# Patient Record
Sex: MALE | Race: WHITE | Employment: FULL TIME | URBAN - METROPOLITAN AREA
[De-identification: names, ages, dates, MRNs, and addresses within clinical notes are randomized per-mention and may not be internally consistent; named-entity substitution may affect disease eponyms.]

---

## 2017-01-26 ENCOUNTER — OFFICE VISIT (OUTPATIENT)
Dept: INTERNAL MEDICINE CLINIC | Facility: CLINIC | Age: 67
End: 2017-01-26

## 2017-01-26 VITALS
BODY MASS INDEX: 35.16 KG/M2 | SYSTOLIC BLOOD PRESSURE: 108 MMHG | DIASTOLIC BLOOD PRESSURE: 58 MMHG | WEIGHT: 224 LBS | OXYGEN SATURATION: 98 % | HEIGHT: 67 IN | HEART RATE: 95 BPM | TEMPERATURE: 98 F

## 2017-01-26 DIAGNOSIS — E66.09 NON MORBID OBESITY DUE TO EXCESS CALORIES: ICD-10-CM

## 2017-01-26 DIAGNOSIS — I10 ESSENTIAL HYPERTENSION: ICD-10-CM

## 2017-01-26 DIAGNOSIS — Z00.00 ANNUAL PHYSICAL EXAM: Primary | ICD-10-CM

## 2017-01-26 DIAGNOSIS — Z12.11 ENCOUNTER FOR SCREENING COLONOSCOPY: ICD-10-CM

## 2017-01-26 DIAGNOSIS — R53.83 FATIGUE, UNSPECIFIED TYPE: ICD-10-CM

## 2017-01-26 PROCEDURE — 99387 INIT PM E/M NEW PAT 65+ YRS: CPT | Performed by: INTERNAL MEDICINE

## 2017-01-26 PROCEDURE — 93010 ELECTROCARDIOGRAM REPORT: CPT | Performed by: INTERNAL MEDICINE

## 2017-01-26 PROCEDURE — 90732 PPSV23 VACC 2 YRS+ SUBQ/IM: CPT | Performed by: INTERNAL MEDICINE

## 2017-01-26 PROCEDURE — 93005 ELECTROCARDIOGRAM TRACING: CPT | Performed by: INTERNAL MEDICINE

## 2017-01-26 PROCEDURE — 90471 IMMUNIZATION ADMIN: CPT | Performed by: INTERNAL MEDICINE

## 2017-01-26 RX ORDER — QUINAPRIL 20 MG/1
20 TABLET ORAL DAILY
COMMUNITY
End: 2017-04-28

## 2017-01-26 RX ORDER — ALBUTEROL SULFATE 90 UG/1
2 AEROSOL, METERED RESPIRATORY (INHALATION) AS NEEDED
COMMUNITY
End: 2018-12-13

## 2017-01-26 NOTE — PROGRESS NOTES
Rosette Flores is a 73year old malePatient presents with:  Establish Care: Last physical exam 1/7/17 Dr. Susan Juarez MD ([address], [facility], [address] [place] [address] X:[phone]), last dental exam 11/10/16. Has never had colonoscopy.  Patient double vision  HEENT: denies nasal congestion, sinus pain, ST, or sore throat  LUNGS: denies shortness of breath, cough or wheezing  CARDIOVASCULAR: denies chest pain or pressure or palpitations  GI: denies abdominal pain, N/V, diarrhea, constipation, hema

## 2017-01-29 PROBLEM — I10 ESSENTIAL HYPERTENSION: Status: ACTIVE | Noted: 2017-01-29

## 2017-01-29 PROBLEM — E66.09 NON MORBID OBESITY DUE TO EXCESS CALORIES: Status: ACTIVE | Noted: 2017-01-29

## 2017-02-07 ENCOUNTER — LAB ENCOUNTER (OUTPATIENT)
Dept: LAB | Age: 67
End: 2017-02-07
Attending: INTERNAL MEDICINE
Payer: COMMERCIAL

## 2017-02-07 DIAGNOSIS — R53.83 FATIGUE, UNSPECIFIED TYPE: ICD-10-CM

## 2017-02-07 DIAGNOSIS — Z00.00 ANNUAL PHYSICAL EXAM: ICD-10-CM

## 2017-02-07 DIAGNOSIS — I10 ESSENTIAL HYPERTENSION: ICD-10-CM

## 2017-02-07 LAB
ALBUMIN SERPL BCP-MCNC: 4.1 G/DL (ref 3.5–4.8)
ALBUMIN/GLOB SERPL: 1 {RATIO} (ref 1–2)
ALP SERPL-CCNC: 94 U/L (ref 32–100)
ALT SERPL-CCNC: 25 U/L (ref 17–63)
ANION GAP SERPL CALC-SCNC: 8 MMOL/L (ref 0–18)
AST SERPL-CCNC: 21 U/L (ref 15–41)
BASOPHILS # BLD: 0.1 K/UL (ref 0–0.2)
BASOPHILS NFR BLD: 1 %
BILIRUB SERPL-MCNC: 0.7 MG/DL (ref 0.3–1.2)
BUN SERPL-MCNC: 14 MG/DL (ref 8–20)
BUN/CREAT SERPL: 12.3 (ref 10–20)
CALCIUM SERPL-MCNC: 9.4 MG/DL (ref 8.5–10.5)
CHLORIDE SERPL-SCNC: 102 MMOL/L (ref 95–110)
CHOLEST SERPL-MCNC: 151 MG/DL (ref 110–200)
CO2 SERPL-SCNC: 27 MMOL/L (ref 22–32)
CREAT SERPL-MCNC: 1.14 MG/DL (ref 0.5–1.5)
EOSINOPHIL # BLD: 0.4 K/UL (ref 0–0.7)
EOSINOPHIL NFR BLD: 5 %
ERYTHROCYTE [DISTWIDTH] IN BLOOD BY AUTOMATED COUNT: 15.1 % (ref 11–15)
GLOBULIN PLAS-MCNC: 4.1 G/DL (ref 2.5–3.7)
GLUCOSE SERPL-MCNC: 140 MG/DL (ref 70–99)
HCT VFR BLD AUTO: 46.7 % (ref 41–52)
HDLC SERPL-MCNC: 36 MG/DL
HGB BLD-MCNC: 15.9 G/DL (ref 13.5–17.5)
LDLC SERPL CALC-MCNC: 98 MG/DL (ref 0–99)
LYMPHOCYTES # BLD: 3 K/UL (ref 1–4)
LYMPHOCYTES NFR BLD: 40 %
MCH RBC QN AUTO: 31.6 PG (ref 27–32)
MCHC RBC AUTO-ENTMCNC: 34 G/DL (ref 32–37)
MCV RBC AUTO: 93.1 FL (ref 80–100)
MONOCYTES # BLD: 0.8 K/UL (ref 0–1)
MONOCYTES NFR BLD: 11 %
NEUTROPHILS # BLD AUTO: 3.4 K/UL (ref 1.8–7.7)
NEUTROPHILS NFR BLD: 44 %
NONHDLC SERPL-MCNC: 115 MG/DL
OSMOLALITY UR CALC.SUM OF ELEC: 287 MOSM/KG (ref 275–295)
PLATELET # BLD AUTO: 252 K/UL (ref 140–400)
PMV BLD AUTO: 9.1 FL (ref 7.4–10.3)
POTASSIUM SERPL-SCNC: 4.3 MMOL/L (ref 3.3–5.1)
PROT SERPL-MCNC: 8.2 G/DL (ref 5.9–8.4)
PSA SERPL-MCNC: 1.1 NG/ML (ref 0–4)
RBC # BLD AUTO: 5.02 M/UL (ref 4.5–5.9)
SODIUM SERPL-SCNC: 137 MMOL/L (ref 136–144)
TRIGL SERPL-MCNC: 86 MG/DL (ref 1–149)
WBC # BLD AUTO: 7.6 K/UL (ref 4–11)

## 2017-02-07 PROCEDURE — 80061 LIPID PANEL: CPT

## 2017-02-07 PROCEDURE — 36415 COLL VENOUS BLD VENIPUNCTURE: CPT

## 2017-02-07 PROCEDURE — 80053 COMPREHEN METABOLIC PANEL: CPT

## 2017-02-07 PROCEDURE — 85025 COMPLETE CBC W/AUTO DIFF WBC: CPT

## 2017-02-08 ENCOUNTER — TELEPHONE (OUTPATIENT)
Dept: INTERNAL MEDICINE CLINIC | Facility: CLINIC | Age: 67
End: 2017-02-08

## 2017-02-08 DIAGNOSIS — R73.01 ABNORMAL FASTING GLUCOSE: Primary | ICD-10-CM

## 2017-02-16 ENCOUNTER — APPOINTMENT (OUTPATIENT)
Dept: LAB | Age: 67
End: 2017-02-16
Attending: INTERNAL MEDICINE
Payer: COMMERCIAL

## 2017-02-16 ENCOUNTER — TELEPHONE (OUTPATIENT)
Dept: INTERNAL MEDICINE CLINIC | Facility: CLINIC | Age: 67
End: 2017-02-16

## 2017-02-16 LAB — HBA1C MFR BLD: 6.9 % (ref 4–6)

## 2017-02-16 PROCEDURE — 83036 HEMOGLOBIN GLYCOSYLATED A1C: CPT | Performed by: INTERNAL MEDICINE

## 2017-02-16 NOTE — TELEPHONE ENCOUNTER
LMTCB   - to  please call patient and schedule aguila for 1 week with  to discuss lab results thank you

## 2017-02-21 ENCOUNTER — TELEPHONE (OUTPATIENT)
Dept: INTERNAL MEDICINE CLINIC | Facility: CLINIC | Age: 67
End: 2017-02-21

## 2017-02-21 NOTE — TELEPHONE ENCOUNTER
Please adjust his appointment for this Thursday: it was a my chart scheduling error: he should come in at 5pm. After this adjustment, please make sure there are no available slots after 4:20. thanks

## 2017-02-23 ENCOUNTER — OFFICE VISIT (OUTPATIENT)
Dept: INTERNAL MEDICINE CLINIC | Facility: CLINIC | Age: 67
End: 2017-02-23

## 2017-02-23 VITALS
TEMPERATURE: 99 F | DIASTOLIC BLOOD PRESSURE: 72 MMHG | OXYGEN SATURATION: 98 % | WEIGHT: 219 LBS | SYSTOLIC BLOOD PRESSURE: 124 MMHG | HEIGHT: 67 IN | HEART RATE: 88 BPM | BODY MASS INDEX: 34.37 KG/M2

## 2017-02-23 DIAGNOSIS — E11.9 NEW ONSET TYPE 2 DIABETES MELLITUS (HCC): Primary | ICD-10-CM

## 2017-02-23 DIAGNOSIS — E78.5 DYSLIPIDEMIA: ICD-10-CM

## 2017-02-23 PROCEDURE — 99214 OFFICE O/P EST MOD 30 MIN: CPT | Performed by: INTERNAL MEDICINE

## 2017-02-23 PROCEDURE — 99212 OFFICE O/P EST SF 10 MIN: CPT | Performed by: INTERNAL MEDICINE

## 2017-02-23 PROCEDURE — 90715 TDAP VACCINE 7 YRS/> IM: CPT | Performed by: INTERNAL MEDICINE

## 2017-02-23 PROCEDURE — 90471 IMMUNIZATION ADMIN: CPT | Performed by: INTERNAL MEDICINE

## 2017-02-23 RX ORDER — SIMVASTATIN 20 MG
20 TABLET ORAL NIGHTLY
Qty: 30 TABLET | Refills: 0 | Status: SHIPPED | OUTPATIENT
Start: 2017-02-23 | End: 2017-03-23

## 2017-02-24 PROBLEM — E78.5 DYSLIPIDEMIA: Status: ACTIVE | Noted: 2017-02-24

## 2017-02-24 PROBLEM — E11.9 DIABETES (HCC): Status: ACTIVE | Noted: 2017-02-24

## 2017-02-24 NOTE — PROGRESS NOTES
Barbie Lam is a 73year old male. Patient presents with:   Follow - Up      HPI:   Barbie Lam is a 73year old male who presents for: review lab results    He is feeling well; he has already lost 5 pounds from starting to walk and cutting down on sod exercise regularly. Given referral to diabetes education.   - [id number] [facility],  ED (INTERNAL)    2. Dyslipidemia   start aspirin 81mg daily, zocor 20mg daily.  Discussed cutting back on nuts, red meat, fried/fatty foods and again exercise to imp

## 2017-03-23 RX ORDER — SIMVASTATIN 20 MG
20 TABLET ORAL NIGHTLY
Qty: 30 TABLET | Refills: 0 | Status: CANCELLED | OUTPATIENT
Start: 2017-03-23

## 2017-03-23 NOTE — TELEPHONE ENCOUNTER
From: Warren Salgado  To: Carla Love DO  Sent: 3/23/2017 3:35 PM CDT  Subject: Medication Renewal Request    Original authorizing provider: DO Warren Kaye would like a refill of the following medications:  MetFORMIN HCl 500 MG Oral Tab

## 2017-03-23 NOTE — TELEPHONE ENCOUNTER
From: Mihaela Stevenson  To: Roman Castrejon DO  Sent: 3/23/2017 1:08 PM CDT  Subject: Medication Renewal Request    Original authorizing provider: DO Mihaela Coleman would like a refill of the following medications:  MetFORMIN HCl 500 MG Oral Tab

## 2017-03-24 RX ORDER — SIMVASTATIN 20 MG
20 TABLET ORAL NIGHTLY
Qty: 90 TABLET | Refills: 1 | Status: SHIPPED | OUTPATIENT
Start: 2017-03-24 | End: 2017-09-18

## 2017-04-08 ENCOUNTER — HOSPITAL ENCOUNTER (OUTPATIENT)
Dept: ENDOCRINOLOGY | Facility: HOSPITAL | Age: 67
Discharge: HOME | End: 2017-04-08
Attending: INTERNAL MEDICINE
Payer: COMMERCIAL

## 2017-04-08 VITALS — BODY MASS INDEX: 33.09 KG/M2 | HEIGHT: 67 IN | WEIGHT: 210.81 LBS

## 2017-04-08 DIAGNOSIS — E11.9 TYPE 2 DIABETES MELLITUS WITHOUT COMPLICATION, WITHOUT LONG-TERM CURRENT USE OF INSULIN (HCC): ICD-10-CM

## 2017-04-08 DIAGNOSIS — E11.9 DIABETES (HCC): Primary | ICD-10-CM

## 2017-04-08 NOTE — PROGRESS NOTES
Nancy Johns  : 1950 attended initial assessment for Diabetes Education:    Date: 2017   Start time: 0900   End time: 1000    Ht 67\"  Wt 210 lb 12.8 oz  BMI 33.01 kg/m2      GLYCOHEMOGLOBIN (HGA1C) (%)   Date Value   2017 6.9*   -------

## 2017-04-22 ENCOUNTER — HOSPITAL ENCOUNTER (OUTPATIENT)
Dept: ENDOCRINOLOGY | Facility: HOSPITAL | Age: 67
Discharge: HOME | End: 2017-04-22
Attending: INTERNAL MEDICINE
Payer: COMMERCIAL

## 2017-04-22 VITALS — BODY MASS INDEX: 32 KG/M2 | WEIGHT: 207.31 LBS

## 2017-04-22 DIAGNOSIS — E11.9 TYPE 2 DIABETES MELLITUS WITHOUT COMPLICATION, WITHOUT LONG-TERM CURRENT USE OF INSULIN (HCC): Primary | ICD-10-CM

## 2017-04-22 NOTE — PROGRESS NOTES
Lili Krish  [id number] attended Diabetes Education Group Class 1:    Date: 4/22/2017  Start time: 1000  End time: 1200    Pt is testing daily before dinner, blood sugars appear controlled.   He is making changes to his diet, eats 3 low-carbohydrate ky

## 2017-04-29 ENCOUNTER — HOSPITAL ENCOUNTER (OUTPATIENT)
Dept: ENDOCRINOLOGY | Facility: HOSPITAL | Age: 67
Discharge: HOME | End: 2017-04-29
Attending: INTERNAL MEDICINE
Payer: COMMERCIAL

## 2017-04-29 VITALS — BODY MASS INDEX: 32 KG/M2 | WEIGHT: 206.19 LBS

## 2017-04-29 DIAGNOSIS — E11.9 TYPE 2 DIABETES MELLITUS WITHOUT COMPLICATION, WITHOUT LONG-TERM CURRENT USE OF INSULIN (HCC): Primary | ICD-10-CM

## 2017-04-29 NOTE — PROGRESS NOTES
Libertad Monroy  [id number] attended Diabetes Education Group Class 2:    Date: 4/29/2017  Start time: 1000  End time: 1200    Wt:  18. 2#, 1# down from last week    Predinner:   (1 at 134)   Recommended that pt alternate predinner with FBG readings.

## 2017-05-06 ENCOUNTER — HOSPITAL ENCOUNTER (OUTPATIENT)
Dept: ENDOCRINOLOGY | Facility: HOSPITAL | Age: 67
Discharge: HOME | End: 2017-05-06
Attending: INTERNAL MEDICINE
Payer: COMMERCIAL

## 2017-05-06 VITALS — WEIGHT: 203 LBS | BODY MASS INDEX: 32 KG/M2

## 2017-05-06 DIAGNOSIS — E11.65 TYPE 2 DIABETES MELLITUS WITH HYPERGLYCEMIA, WITHOUT LONG-TERM CURRENT USE OF INSULIN (HCC): Primary | ICD-10-CM

## 2017-05-06 NOTE — PROGRESS NOTES
Henry Bocanegra  [id number] attended Diabetes Education Group Class 3:     Date: 5/6/2017  Start time: 1000 End time: 18    Prevention, detection and treatment of chronic complications  Reviewed how to reduce risks of complications including eye, foot, d

## 2017-05-13 ENCOUNTER — HOSPITAL ENCOUNTER (OUTPATIENT)
Dept: ENDOCRINOLOGY | Facility: HOSPITAL | Age: 67
Discharge: HOME | End: 2017-05-13
Attending: INTERNAL MEDICINE
Payer: COMMERCIAL

## 2017-05-13 VITALS — BODY MASS INDEX: 32 KG/M2 | WEIGHT: 201.31 LBS

## 2017-05-13 DIAGNOSIS — E11.65 TYPE 2 DIABETES MELLITUS WITH HYPERGLYCEMIA, WITHOUT LONG-TERM CURRENT USE OF INSULIN (HCC): Primary | ICD-10-CM

## 2017-05-13 NOTE — PROGRESS NOTES
Carmina Salcido  [id number] attended Diabetes Education Group Class 4:     Date: 2017  Start time: 1000  End time: 11:45    Wt:  201.3#     Pt has lost 9.5# since starting diabetes classes 1 month ago.     FB-110 (1 at 140)        Predinner:  87-

## 2017-05-30 DIAGNOSIS — E11.9 DIABETES (HCC): ICD-10-CM

## 2017-05-31 NOTE — TELEPHONE ENCOUNTER
From: Radha Wright  To: Francisco Guerra DO  Sent: 5/30/2017 3:43 PM CDT  Subject: Medication Renewal Request    Original authorizing provider: DO Radha Morales would like a refill of the following medications:  Blood Glucose Monitoring Suppl

## 2017-06-01 ENCOUNTER — TELEPHONE (OUTPATIENT)
Dept: INTERNAL MEDICINE CLINIC | Facility: CLINIC | Age: 67
End: 2017-06-01

## 2017-06-01 ENCOUNTER — LAB ENCOUNTER (OUTPATIENT)
Dept: LAB | Age: 67
End: 2017-06-01
Attending: INTERNAL MEDICINE
Payer: COMMERCIAL

## 2017-06-01 DIAGNOSIS — E78.5 DYSLIPIDEMIA: ICD-10-CM

## 2017-06-01 DIAGNOSIS — E11.9 TYPE 2 DIABETES MELLITUS WITHOUT COMPLICATION, WITHOUT LONG-TERM CURRENT USE OF INSULIN (HCC): ICD-10-CM

## 2017-06-01 DIAGNOSIS — R53.83 FATIGUE, UNSPECIFIED TYPE: ICD-10-CM

## 2017-06-01 DIAGNOSIS — E11.9 TYPE 2 DIABETES MELLITUS WITHOUT COMPLICATION, WITHOUT LONG-TERM CURRENT USE OF INSULIN (HCC): Primary | ICD-10-CM

## 2017-06-01 PROCEDURE — 80053 COMPREHEN METABOLIC PANEL: CPT

## 2017-06-01 PROCEDURE — 82570 ASSAY OF URINE CREATININE: CPT

## 2017-06-01 PROCEDURE — 80061 LIPID PANEL: CPT

## 2017-06-01 PROCEDURE — 36415 COLL VENOUS BLD VENIPUNCTURE: CPT

## 2017-06-01 PROCEDURE — 83036 HEMOGLOBIN GLYCOSYLATED A1C: CPT

## 2017-06-01 PROCEDURE — 85025 COMPLETE CBC W/AUTO DIFF WBC: CPT

## 2017-06-01 PROCEDURE — 82043 UR ALBUMIN QUANTITATIVE: CPT

## 2017-06-01 NOTE — TELEPHONE ENCOUNTER
Patient at lab downstairs to get labs drawn, states he is fasting and was told in February that he needs to have repeat labs done in 3 months. No lab order in the system.  Per 2/23/17 office visit note, \"repeat labs in 3 months\" and do urine microalbumin

## 2017-06-04 ENCOUNTER — TELEPHONE (OUTPATIENT)
Dept: INTERNAL MEDICINE CLINIC | Facility: CLINIC | Age: 67
End: 2017-06-04

## 2017-06-04 DIAGNOSIS — E11.9 TYPE 2 DIABETES MELLITUS WITHOUT COMPLICATION, WITHOUT LONG-TERM CURRENT USE OF INSULIN (HCC): Primary | ICD-10-CM

## 2017-06-05 NOTE — TELEPHONE ENCOUNTER
Patient called back -relayed DR. LOAIZA message and he verbalized understanding . Number for DR. Ch given

## 2017-06-05 NOTE — TELEPHONE ENCOUNTER
Please notify patient that labs are good-DM is controlled, and cholesterol is controlled. Would like him to continue on these medications. For the diabetes, it is important that he get a proper eye exam to monitor for DM affecting the eye.  Would like him t

## 2017-07-17 ENCOUNTER — OFFICE VISIT (OUTPATIENT)
Dept: OPTOMETRY | Facility: CLINIC | Age: 67
End: 2017-07-17

## 2017-07-17 DIAGNOSIS — H52.13 MYOPIA, BILATERAL: ICD-10-CM

## 2017-07-17 DIAGNOSIS — E11.9 TYPE 2 DIABETES MELLITUS WITHOUT COMPLICATION, WITHOUT LONG-TERM CURRENT USE OF INSULIN (HCC): Primary | ICD-10-CM

## 2017-07-17 DIAGNOSIS — H40.003 GLAUCOMA SUSPECT, BILATERAL: ICD-10-CM

## 2017-07-17 DIAGNOSIS — H25.13 AGE-RELATED NUCLEAR CATARACT OF BOTH EYES: ICD-10-CM

## 2017-07-17 PROBLEM — H52.10 MYOPIA: Status: ACTIVE | Noted: 2017-07-17

## 2017-07-17 PROBLEM — H40.009 GLAUCOMA SUSPECT: Status: ACTIVE | Noted: 2017-07-17

## 2017-07-17 PROCEDURE — 92004 COMPRE OPH EXAM NEW PT 1/>: CPT | Performed by: OPTOMETRIST

## 2017-07-17 PROCEDURE — 92015 DETERMINE REFRACTIVE STATE: CPT | Performed by: OPTOMETRIST

## 2017-07-17 NOTE — ASSESSMENT & PLAN NOTE
I advised patient that due to CD asymmetry that he should have a baseline VF OCT & pachy. Appointment made.

## 2017-07-17 NOTE — PROGRESS NOTES
Nico Lopez is a 78year old male. HPI:     HPI     Diabetic Eye Exam   Diabetes characteristics include Type 2, controlled with diet and taking oral medications. Duration of 5 months.            Comments   Patient was referred by his PCP for a diabe Sulfate  (90 Base) MCG/ACT Inhalation Aero Soln Inhale 2 puffs into the lungs as needed for Wheezing (2-3 times daily PRN).  Disp:  Rfl:        Allergies:    Penicillins             Hives    ROS:     ROS     Negative for: Constitutional, Gastrointest Refraction (Auto)       Sphere Cylinder Axis Dist VA Add    Right -7.75 -2.25 090      Left -6.25 -0.75 090            Manifest Refraction #2       Sphere Cylinder Axis Dist VA Add    Right -8.00 -1.50 100 20/30-2 +2.50    Left -7.00 -1.25 100 20/25-3 +2. 5

## 2017-07-17 NOTE — PATIENT INSTRUCTIONS
Age-related nuclear cataract of both eyes  No treatment is required. Will continue to observe. Glaucoma suspect  I advised patient that due to CD asymmetry that he should have a baseline VF OCT & pachy. Appointment made.     Diabetes ([address]  I advised p

## 2017-08-19 ENCOUNTER — OFFICE VISIT (OUTPATIENT)
Dept: OPHTHALMOLOGY | Facility: CLINIC | Age: 67
End: 2017-08-19

## 2017-08-19 DIAGNOSIS — H40.003 GLAUCOMA SUSPECT, BILATERAL: ICD-10-CM

## 2017-08-19 PROCEDURE — 92133 CPTRZD OPH DX IMG PST SGM ON: CPT | Performed by: OPHTHALMOLOGY

## 2017-08-19 PROCEDURE — 92083 EXTENDED VISUAL FIELD XM: CPT | Performed by: OPHTHALMOLOGY

## 2017-08-19 PROCEDURE — 76514 ECHO EXAM OF EYE THICKNESS: CPT | Performed by: OPHTHALMOLOGY

## 2017-08-23 NOTE — PROGRESS NOTES
Jerry Spicer is a 78year old male. HPI:     HPI     Patient is here for a glaucoma work up with HVF, OCT and Pachy, rodolfo TODD     Last edited by Akilah Gilliam on 8/19/2017  9:57 AM. (History)        Patient History:  Past Medical History:   Diagnosis D ASSESSMENT/PLAN:     Diagnoses and Plan:     Glaucoma suspect  Normal VF, OU. Normal OCT, OD. Abnormal OCT, OS. Start Latanoprost OS, Q HS. No orders of the defined types were placed in this encounter.       Meds This Visit:    No prescriptions reque

## 2017-08-24 RX ORDER — LATANOPROST 50 UG/ML
1 SOLUTION/ DROPS OPHTHALMIC NIGHTLY
Qty: 1 BOTTLE | Refills: 11 | Status: SHIPPED | OUTPATIENT
Start: 2017-08-24 | End: 2017-09-23

## 2017-08-24 NOTE — TELEPHONE ENCOUNTER
Spoke to pt and Rx sent to [facility] to sign off on. Pt is scheduled for IOP and Gonio on Dec 9 at 10:45.

## 2017-08-24 NOTE — TELEPHONE ENCOUNTER
LM for pt to call us back. Pt will be starting Latanoprost OU qhs and return in December 2017 for IOP and Gonio with RJM. Waiting for call back from Pt. Rx on Latanoprost pended to BILL.

## 2017-09-18 RX ORDER — SIMVASTATIN 20 MG
20 TABLET ORAL NIGHTLY
Qty: 90 TABLET | Refills: 0 | Status: SHIPPED | OUTPATIENT
Start: 2017-09-18 | End: 2017-11-13

## 2017-09-18 NOTE — TELEPHONE ENCOUNTER
From: Marcela Carmona  Sent: 9/18/2017 10:28 AM CDT  Subject: Medication Renewal Request    Irina Jazlyn Heart Nurse would like a refill of the following medications:     MetFORMIN HCl 500 MG Oral Tab Keyur Randle DO]     simvastatin (ZOCOR) 20 MG Oral Tab [Elyse Na

## 2017-11-13 ENCOUNTER — OFFICE VISIT (OUTPATIENT)
Dept: INTERNAL MEDICINE CLINIC | Facility: CLINIC | Age: 67
End: 2017-11-13

## 2017-11-13 VITALS
HEART RATE: 78 BPM | HEIGHT: 67 IN | SYSTOLIC BLOOD PRESSURE: 110 MMHG | DIASTOLIC BLOOD PRESSURE: 82 MMHG | BODY MASS INDEX: 29.03 KG/M2 | WEIGHT: 185 LBS

## 2017-11-13 DIAGNOSIS — Z11.59 NEED FOR HEPATITIS C SCREENING TEST: ICD-10-CM

## 2017-11-13 DIAGNOSIS — I10 ESSENTIAL HYPERTENSION: ICD-10-CM

## 2017-11-13 DIAGNOSIS — Z13.1 SCREENING FOR DIABETES MELLITUS (DM): ICD-10-CM

## 2017-11-13 DIAGNOSIS — E11.9 TYPE 2 DIABETES MELLITUS WITHOUT COMPLICATION, WITHOUT LONG-TERM CURRENT USE OF INSULIN (HCC): ICD-10-CM

## 2017-11-13 DIAGNOSIS — Z12.5 SCREENING FOR PROSTATE CANCER: ICD-10-CM

## 2017-11-13 DIAGNOSIS — Z00.00 ENCOUNTER FOR ANNUAL HEALTH EXAMINATION: ICD-10-CM

## 2017-11-13 DIAGNOSIS — Z00.00 ANNUAL PHYSICAL EXAM: Primary | ICD-10-CM

## 2017-11-13 DIAGNOSIS — Z13.6 SCREENING FOR CARDIOVASCULAR CONDITION: ICD-10-CM

## 2017-11-13 DIAGNOSIS — E78.5 DYSLIPIDEMIA: ICD-10-CM

## 2017-11-13 DIAGNOSIS — R53.83 FATIGUE, UNSPECIFIED TYPE: ICD-10-CM

## 2017-11-13 DIAGNOSIS — L40.9 PSORIASIS: ICD-10-CM

## 2017-11-13 PROCEDURE — 99213 OFFICE O/P EST LOW 20 MIN: CPT | Performed by: INTERNAL MEDICINE

## 2017-11-13 PROCEDURE — 90653 IIV ADJUVANT VACCINE IM: CPT | Performed by: INTERNAL MEDICINE

## 2017-11-13 PROCEDURE — G0008 ADMIN INFLUENZA VIRUS VAC: HCPCS | Performed by: INTERNAL MEDICINE

## 2017-11-13 PROCEDURE — G0439 PPPS, SUBSEQ VISIT: HCPCS | Performed by: INTERNAL MEDICINE

## 2017-11-13 RX ORDER — LATANOPROST 50 UG/ML
1 SOLUTION/ DROPS OPHTHALMIC NIGHTLY
Refills: 1 | COMMUNITY
Start: 2017-11-01 | End: 2018-05-12

## 2017-11-13 RX ORDER — ASPIRIN 81 MG/1
81 TABLET ORAL DAILY
Qty: 90 TABLET | Refills: 3 | Status: SHIPPED | OUTPATIENT
Start: 2017-11-13 | End: 2019-06-24

## 2017-11-13 RX ORDER — SIMVASTATIN 20 MG
20 TABLET ORAL NIGHTLY
Qty: 90 TABLET | Refills: 0 | Status: SHIPPED | OUTPATIENT
Start: 2017-11-13 | End: 2018-03-19

## 2017-11-13 RX ORDER — FLUOCINONIDE 0.5 MG/G
1 OINTMENT TOPICAL 2 TIMES DAILY
Qty: 60 G | Refills: 3 | Status: SHIPPED | OUTPATIENT
Start: 2017-11-13 | End: 2021-06-08

## 2017-11-13 NOTE — PROGRESS NOTES
Diabetic Annual Assessment Clinical Note    /82 (BP Location: Left arm, Patient Position: Sitting, Cuff Size: adult)   Pulse 78   Ht 5' 7\" (1.702 m)   Wt 185 lb (83.9 kg)   BMI 28.98 kg/m²     Foot Assessment    Visual Inspection of the feet complet

## 2017-11-13 NOTE — PROGRESS NOTES
HPI:   Eri Fu is a 78year old male who presents for a Medicare Subsequent Annual Wellness visit (Pt already had Initial Annual Wellness).     He has a past medical history of HTN, type 2 DM, dyslipidemia, psoriasis, osteoarthritis, possible NEXT TEST) In Vitro Strip 1 each by Other route 2 (two) times daily. Use as directed. BRANT MICROLET LANCETS Does not apply Misc 1 lancet by Finger stick route 2 (two) times daily. Use as directed.       MEDICAL INFORMATION:   He  has a past medical histo ask people to repeat themselves:  No   I especially have trouble understanding the speech of women and children:  No I have trouble understanding the speaker in a large room such as at a meeting or place of [facility]:  No   Many people I talk to seem to mumb Pneumococcal, Zoster, Tetanus     Immunization History   Administered Date(s) Administered   • Pneumovax 23 01/26/2017   • TDAP 02/23/2017       ASSESSMENT AND OTHER RELEVANT CHRONIC CONDITIONS:   Marcela Carmona is a 78year old male who presents for a Med B, Tetanus, or Pneumococcal?: No     Functional Ability     Bathing or Showering: Able without help    Toileting: Able without help    Dressing: Able without help    Eating: Able without help    Driving: Able without help    Preparing your meals: Able with Maintenance if applicable    Flex Sigmoidoscopy Screen every 10 years No results found for this or any previous visit. No flowsheet data found. Fecal Occult Blood Annually No results found for: FOB No flowsheet data found.     Glaucoma Screening      Op

## 2017-11-13 NOTE — PATIENT INSTRUCTIONS
Felicitas Reese's SCREENING SCHEDULE   Tests on this list are recommended by your physician but may not be covered, or covered at this frequency, by your insurer. Please check with your insurance carrier before scheduling to verify coverage.     PREVENTATI Colonoscopy Screen   Covered every 10 years- more often if abnormal Colonoscopy,10 Years due on 06/06/2001 Update Health Maintenance if applicable    Flex Sigmoidoscopy Screen  Covered every 5 years No results found for this or any previous visit.  No flows 02/23/17  -TETANUS, DIPHTHERIA TOXOIDS AND ACELLULAR PERTUSIS VACCINE (TDAP), >7 YEARS, IM USE    This may be covered with your prescription benefits, but Medicare does not cover unless Medically needed    Zoster (Not covered by Medicare Part B) No orders

## 2017-12-09 ENCOUNTER — OFFICE VISIT (OUTPATIENT)
Dept: OPHTHALMOLOGY | Facility: CLINIC | Age: 67
End: 2017-12-09

## 2017-12-09 DIAGNOSIS — H40.1121 PRIMARY OPEN-ANGLE GLAUCOMA, LEFT EYE, MILD STAGE: Primary | ICD-10-CM

## 2017-12-09 PROCEDURE — G0463 HOSPITAL OUTPT CLINIC VISIT: HCPCS | Performed by: OPHTHALMOLOGY

## 2017-12-09 PROCEDURE — 99213 OFFICE O/P EST LOW 20 MIN: CPT | Performed by: OPHTHALMOLOGY

## 2017-12-09 PROCEDURE — 92020 GONIOSCOPY: CPT | Performed by: OPHTHALMOLOGY

## 2017-12-09 NOTE — PROGRESS NOTES
Ezequiel Casper is a 78year old male. HPI:     HPI     Patient is here for an IOP check and gonioscopy. Patient started Latanoprost OS QHS on 8/23/17. Patient has no ocular complaints.       Last edited by Caio Wick on 12/9/2017 10:40 AM. (History daily. Use as directed. Disp: 100 strip Rfl: 1   BRANT MICROLET LANCETS Does not apply Misc 1 lancet by Finger stick route 2 (two) times daily. Use as directed.  Disp: 1 Box Rfl: 1   Albuterol Sulfate  (90 Base) MCG/ACT Inhalation Aero Soln Inhale 2 bedtime in the left eye. Discussed that he should continue taking Latanoprost every night in the left eye unless it is changed by the doctor.     Discussed potential side effects of Latanoprost drops such as color changes of the iris, mild redness of the

## 2017-12-09 NOTE — PATIENT INSTRUCTIONS
Primary open-angle glaucoma, left eye, mild stage  IOP has improved since starting Latanoprost at bedtime in the left eye. Continue Latanoprost at bedtime in the left eye.    Discussed that he should continue taking Latanoprost every night in the left eye

## 2017-12-13 DIAGNOSIS — E11.9 TYPE 2 DIABETES MELLITUS WITHOUT COMPLICATION, WITHOUT LONG-TERM CURRENT USE OF INSULIN (HCC): ICD-10-CM

## 2017-12-13 DIAGNOSIS — E78.5 DYSLIPIDEMIA: ICD-10-CM

## 2017-12-13 RX ORDER — SIMVASTATIN 20 MG
20 TABLET ORAL NIGHTLY
Qty: 90 TABLET | Refills: 0
Start: 2017-12-13

## 2017-12-13 NOTE — TELEPHONE ENCOUNTER
Metformin was last filled 11/13 for #180 (3 month supply) no RF, and Simvastatin was filled 11/13/17 for #90 (3 month supply) no refill. [employer] message sent to patient.      Current refill request refused due to refill is either a duplicate request or has

## 2017-12-13 NOTE — TELEPHONE ENCOUNTER
From: Libertad Monroy  Sent: 12/13/2017 10:19 AM CST  Subject: Medication Renewal Request    Barron Ureña.  Ugo Lee would like a refill of the following medications:     MetFORMIN HCl 500 MG Oral Tab Rosalind Ordonez DORadha     simvastatin (ZOCOR) 20 MG Oral Tab Eric LOAIZA

## 2017-12-29 ENCOUNTER — OFFICE VISIT (OUTPATIENT)
Dept: INTERNAL MEDICINE CLINIC | Facility: CLINIC | Age: 67
End: 2017-12-29

## 2017-12-29 VITALS
SYSTOLIC BLOOD PRESSURE: 136 MMHG | WEIGHT: 194 LBS | TEMPERATURE: 98 F | DIASTOLIC BLOOD PRESSURE: 66 MMHG | BODY MASS INDEX: 30 KG/M2 | HEART RATE: 89 BPM | OXYGEN SATURATION: 98 %

## 2017-12-29 DIAGNOSIS — J02.0 STREP PHARYNGITIS: Primary | ICD-10-CM

## 2017-12-29 PROCEDURE — 99213 OFFICE O/P EST LOW 20 MIN: CPT | Performed by: INTERNAL MEDICINE

## 2017-12-29 PROCEDURE — G0463 HOSPITAL OUTPT CLINIC VISIT: HCPCS | Performed by: INTERNAL MEDICINE

## 2017-12-29 RX ORDER — AZITHROMYCIN 250 MG/1
TABLET, FILM COATED ORAL
Qty: 6 TABLET | Refills: 0 | Status: SHIPPED | OUTPATIENT
Start: 2017-12-29 | End: 2018-04-24

## 2017-12-29 NOTE — PROGRESS NOTES
Madie Ocampo is a 78year old male. Patient presents with:  Sore Throat: Onset: 4 days ago - got worse yesterday but a little better today. No fever/chills. \"Cough due to throat\". Rare sneezing, some runny nose.       HPI:   Madie Ocampo is a 78 yea daily PRN).  Disp:  Rfl:       Past Medical History:   Diagnosis Date   • Diabetes ([address]    • Dyslipidemia    • Essential hypertension    • Primary open-angle glaucoma, left eye, mild stage 8/23/2017 8//23/17 Started on Latanoprost qhs OS by Dr. Kosta Avila

## 2018-01-11 DIAGNOSIS — E11.9 DIABETES (HCC): ICD-10-CM

## 2018-01-12 NOTE — TELEPHONE ENCOUNTER
From: Warren Salgado  Sent: 1/11/2018 8:44 PM CST  Subject: Medication Renewal Request    Gunjan Brush.  Kristel Paulino would like a refill of the following medications:     Glucose Blood (BRANT CONTOUR NEXT TEST) In Vitro Strip [facility] [address] [facility] [address]

## 2018-03-19 DIAGNOSIS — E78.5 DYSLIPIDEMIA: ICD-10-CM

## 2018-03-19 DIAGNOSIS — E11.9 TYPE 2 DIABETES MELLITUS WITHOUT COMPLICATION, WITHOUT LONG-TERM CURRENT USE OF INSULIN (HCC): ICD-10-CM

## 2018-03-20 RX ORDER — SIMVASTATIN 20 MG
20 TABLET ORAL NIGHTLY
Qty: 90 TABLET | Refills: 0 | Status: SHIPPED | OUTPATIENT
Start: 2018-03-20 | End: 2018-06-19

## 2018-03-20 NOTE — TELEPHONE ENCOUNTER
Simvastatin refilled. Will route to  to please review and clarify metformin directions as metformin 500 mg BID was eRxed at 11/13/17 office visit but office visit note indicates metformin 500 mg only once daily.

## 2018-03-20 NOTE — TELEPHONE ENCOUNTER
From: Barbie Lam  Sent: 3/19/2018 1:00 PM CDT  Subject: Medication Renewal Request    Porsche Negrete.  Berwyn Bence would like a refill of the following medications:     MetFORMIN HCl 500 MG Oral Tab NADEEN Mcbride     simvastatin (ZOCOR) 20 MG Oral Tab [Elyse Dewitti

## 2018-04-24 RX ORDER — QUINAPRIL 20 MG/1
20 TABLET ORAL DAILY
Qty: 90 TABLET | Refills: 3 | Status: SHIPPED
Start: 2018-04-24 | End: 2019-04-22

## 2018-04-25 NOTE — TELEPHONE ENCOUNTER
From: Marcela Carmona  Sent: 4/23/2018 1:09 PM CDT  Subject: Medication Renewal Request    Irina Jazlyn Heart Nurse would like a refill of the following medications:     Quinapril HCl (ACCUPRIL) 20 MG Oral Tab Keyur Randle DO]    Preferred pharmacy: [address] [place] [address]

## 2018-05-12 ENCOUNTER — OFFICE VISIT (OUTPATIENT)
Dept: OPHTHALMOLOGY | Facility: CLINIC | Age: 68
End: 2018-05-12

## 2018-05-12 DIAGNOSIS — E11.9 TYPE 2 DIABETES MELLITUS WITHOUT RETINOPATHY (HCC): ICD-10-CM

## 2018-05-12 DIAGNOSIS — H25.13 AGE-RELATED NUCLEAR CATARACT OF BOTH EYES: ICD-10-CM

## 2018-05-12 DIAGNOSIS — H40.1121 PRIMARY OPEN-ANGLE GLAUCOMA, LEFT EYE, MILD STAGE: Primary | ICD-10-CM

## 2018-05-12 PROCEDURE — 92014 COMPRE OPH EXAM EST PT 1/>: CPT | Performed by: OPHTHALMOLOGY

## 2018-05-12 PROCEDURE — 92250 FUNDUS PHOTOGRAPHY W/I&R: CPT | Performed by: OPHTHALMOLOGY

## 2018-05-12 RX ORDER — LATANOPROST 50 UG/ML
1 SOLUTION/ DROPS OPHTHALMIC NIGHTLY
Qty: 3 BOTTLE | Refills: 3 | Status: SHIPPED | OUTPATIENT
Start: 2018-05-12 | End: 2018-10-24

## 2018-05-12 NOTE — PROGRESS NOTES
Mikal Doll is a 78year old male.     HPI:     HPI     Diabetic Eye Exam    Additional comments: Pt has been a diabetic for 1 year  1 years on pills/ 0 years on Insulin   Pt checks his BS once a day   Pt's last blood sugar was 96 yesterday  Last HA1C wa Strip Use to test blood glucose twice daily as directed Disp: 100 strip Rfl: 3   Fluocinonide 0.05 % External Ointment Apply 1 g topically 2 (two) times daily. Disp: 60 g Rfl: 3   aspirin 81 MG Oral Tab EC Take 1 tablet (81 mg total) by mouth daily.  Disp: Cortical cataract inferonasal   1+ Nuclear sclerosis    Vitreous Vitreous floaters Vitreous floaters          Fundus Exam       Right Left    Disc Good rim  Good rim     C/D Ratio 0.6 0.8    Macula Normal- no BDR Normal- no BDR    Vessels Normal Normal

## 2018-05-12 NOTE — PATIENT INSTRUCTIONS
Type 2 diabetes mellitus without retinopathy ([facility] [address]  Diabetes type II: no background of retinopathy, no signs of neovascularization noted. Discussed ocular and systemic benefits of blood sugar control.   Diagnosis and treatment discussed in detail with christianne

## 2018-05-12 NOTE — ASSESSMENT & PLAN NOTE
Continue Latanoprost at bedtime in the left eye. Discussed that he should continue taking Latanoprost every night in the left eye unless it is changed by the doctor. Photos were taken today to document optic nerves.    Will have patient back in 4 month

## 2018-06-19 DIAGNOSIS — E11.9 TYPE 2 DIABETES MELLITUS WITHOUT COMPLICATION, WITHOUT LONG-TERM CURRENT USE OF INSULIN (HCC): ICD-10-CM

## 2018-06-19 DIAGNOSIS — E78.5 DYSLIPIDEMIA: ICD-10-CM

## 2018-06-20 ENCOUNTER — LAB ENCOUNTER (OUTPATIENT)
Dept: LAB | Age: 68
End: 2018-06-20
Attending: INTERNAL MEDICINE
Payer: MEDICARE

## 2018-06-20 DIAGNOSIS — Z12.5 SCREENING FOR PROSTATE CANCER: ICD-10-CM

## 2018-06-20 DIAGNOSIS — R53.83 FATIGUE, UNSPECIFIED TYPE: ICD-10-CM

## 2018-06-20 DIAGNOSIS — E11.9 TYPE 2 DIABETES MELLITUS WITHOUT COMPLICATION, WITHOUT LONG-TERM CURRENT USE OF INSULIN (HCC): ICD-10-CM

## 2018-06-20 DIAGNOSIS — Z13.6 SCREENING FOR CARDIOVASCULAR CONDITION: ICD-10-CM

## 2018-06-20 DIAGNOSIS — I10 ESSENTIAL HYPERTENSION: ICD-10-CM

## 2018-06-20 DIAGNOSIS — Z13.1 SCREENING FOR DIABETES MELLITUS (DM): ICD-10-CM

## 2018-06-20 DIAGNOSIS — Z11.59 NEED FOR HEPATITIS C SCREENING TEST: ICD-10-CM

## 2018-06-20 DIAGNOSIS — E78.5 DYSLIPIDEMIA: ICD-10-CM

## 2018-06-20 PROCEDURE — 36415 COLL VENOUS BLD VENIPUNCTURE: CPT

## 2018-06-20 PROCEDURE — 80061 LIPID PANEL: CPT

## 2018-06-20 PROCEDURE — 86803 HEPATITIS C AB TEST: CPT

## 2018-06-20 PROCEDURE — 82043 UR ALBUMIN QUANTITATIVE: CPT

## 2018-06-20 PROCEDURE — 80053 COMPREHEN METABOLIC PANEL: CPT

## 2018-06-20 PROCEDURE — 85025 COMPLETE CBC W/AUTO DIFF WBC: CPT

## 2018-06-20 PROCEDURE — 82570 ASSAY OF URINE CREATININE: CPT

## 2018-06-20 PROCEDURE — 83036 HEMOGLOBIN GLYCOSYLATED A1C: CPT

## 2018-06-20 RX ORDER — SIMVASTATIN 20 MG
20 TABLET ORAL NIGHTLY
Qty: 90 TABLET | Refills: 0 | Status: SHIPPED
Start: 2018-06-20 | End: 2018-09-14

## 2018-07-02 ENCOUNTER — OFFICE VISIT (OUTPATIENT)
Dept: INTERNAL MEDICINE CLINIC | Facility: CLINIC | Age: 68
End: 2018-07-02

## 2018-07-02 VITALS
SYSTOLIC BLOOD PRESSURE: 130 MMHG | OXYGEN SATURATION: 98 % | RESPIRATION RATE: 16 BRPM | HEART RATE: 82 BPM | DIASTOLIC BLOOD PRESSURE: 68 MMHG | WEIGHT: 194 LBS | TEMPERATURE: 99 F | BODY MASS INDEX: 30.45 KG/M2 | HEIGHT: 67 IN

## 2018-07-02 DIAGNOSIS — E11.9 TYPE 2 DIABETES MELLITUS WITHOUT COMPLICATION, WITHOUT LONG-TERM CURRENT USE OF INSULIN (HCC): Primary | ICD-10-CM

## 2018-07-02 DIAGNOSIS — I10 ESSENTIAL HYPERTENSION: ICD-10-CM

## 2018-07-02 DIAGNOSIS — L40.9 PSORIASIS: ICD-10-CM

## 2018-07-02 DIAGNOSIS — E78.5 DYSLIPIDEMIA: ICD-10-CM

## 2018-07-02 PROCEDURE — 90670 PCV13 VACCINE IM: CPT | Performed by: INTERNAL MEDICINE

## 2018-07-02 PROCEDURE — 99214 OFFICE O/P EST MOD 30 MIN: CPT | Performed by: INTERNAL MEDICINE

## 2018-07-02 PROCEDURE — G0463 HOSPITAL OUTPT CLINIC VISIT: HCPCS | Performed by: INTERNAL MEDICINE

## 2018-07-02 PROCEDURE — G0009 ADMIN PNEUMOCOCCAL VACCINE: HCPCS | Performed by: INTERNAL MEDICINE

## 2018-07-03 NOTE — PROGRESS NOTES
Kan Saldana is a 80year old male. Patient presents with:  Diabetes:  Follow up, Pt had labs done 6/20/18  HTN      HPI:   Kan Saldana is a 80year old male who presents for: 6 month follow up on DM      He has a past medical history of HTN, type 2 DM, (2-3 times daily PRN).  Disp:  Rfl:       Past Medical History:   Diagnosis Date   • Diabetes [address] [address]    • Dyslipidemia    • Essential hypertension    • Primary open-angle glaucoma, left eye, mild stage 8/23/2017 8//23/17 Started on Latanoprost qhs OS by YISSEL schedule colonoscopy. Repeat blood work prior to next visit.        Tamiko Blackman DO  7/2/2018  8:01 PM

## 2018-08-08 ENCOUNTER — OFFICE VISIT (OUTPATIENT)
Dept: INTERNAL MEDICINE CLINIC | Facility: CLINIC | Age: 68
End: 2018-08-08
Payer: MEDICARE

## 2018-08-08 VITALS
HEIGHT: 68 IN | BODY MASS INDEX: 29.55 KG/M2 | HEART RATE: 85 BPM | TEMPERATURE: 99 F | SYSTOLIC BLOOD PRESSURE: 124 MMHG | DIASTOLIC BLOOD PRESSURE: 70 MMHG | WEIGHT: 195 LBS | OXYGEN SATURATION: 98 %

## 2018-08-08 DIAGNOSIS — J02.9 SORE THROAT: Primary | ICD-10-CM

## 2018-08-08 LAB
CONTROL LINE PRESENT WITH A CLEAR BACKGROUND (YES/NO): YES YES/NO
KIT LOT #: NORMAL NUMERIC
STREP GRP A CUL-SCR: NEGATIVE

## 2018-08-08 PROCEDURE — 87880 STREP A ASSAY W/OPTIC: CPT | Performed by: INTERNAL MEDICINE

## 2018-08-08 PROCEDURE — 99213 OFFICE O/P EST LOW 20 MIN: CPT | Performed by: INTERNAL MEDICINE

## 2018-08-08 PROCEDURE — G0463 HOSPITAL OUTPT CLINIC VISIT: HCPCS | Performed by: INTERNAL MEDICINE

## 2018-08-08 NOTE — PROGRESS NOTES
Nancy Johns is a 80year old male. Patient presents with:  Sore Throat: Patient is here d/t a sore throat x 2 days      HPI:   Nancy Johns is a 80year old male who presents for:  Woke up Monday morning with sore throat. Used cepacol with some relief. 8/23/2017 8//23/17 Started on Latanoprost qhs OS by Dr. Nolvia Salazar due to thinning of the optic nerve OS on OCT    • Psoriasis    • Wears glasses       History reviewed. No pertinent surgical history.    Family History   Problem Relation Age of Onset   • C

## 2018-09-08 ENCOUNTER — OFFICE VISIT (OUTPATIENT)
Dept: OPHTHALMOLOGY | Facility: CLINIC | Age: 68
End: 2018-09-08
Payer: MEDICARE

## 2018-09-08 DIAGNOSIS — H40.1121 PRIMARY OPEN-ANGLE GLAUCOMA, LEFT EYE, MILD STAGE: ICD-10-CM

## 2018-09-08 PROCEDURE — 99213 OFFICE O/P EST LOW 20 MIN: CPT | Performed by: OPHTHALMOLOGY

## 2018-09-08 PROCEDURE — G0463 HOSPITAL OUTPT CLINIC VISIT: HCPCS | Performed by: OPHTHALMOLOGY

## 2018-09-08 PROCEDURE — 92083 EXTENDED VISUAL FIELD XM: CPT | Performed by: OPHTHALMOLOGY

## 2018-09-08 PROCEDURE — 92133 CPTRZD OPH DX IMG PST SGM ON: CPT | Performed by: OPHTHALMOLOGY

## 2018-09-08 NOTE — PROGRESS NOTES
Janelle Jonas is a 80year old male. HPI:     HPI     Patient is here for a VF, OCT and IOP check. He is taking Latanoprost OS QHS as directed.       Last edited by Hector Roman OT on 9/8/2018  8:17 AM. (History)        Patient History:  Past Medica TEST) In Vitro Strip 1 each by Other route 2 (two) times daily. Use as directed. Disp: 100 strip Rfl: 1   Albuterol Sulfate  (90 Base) MCG/ACT Inhalation Aero Soln Inhale 2 puffs into the lungs as needed for Wheezing (2-3 times daily PRN).  Disp:  Rf prescriptions requested or ordered in this encounter        Follow up instructions:  Return in about 4 months (around 1/8/2019) for IOP check.     9/8/2018  Scribed by: Royce Crooks MD

## 2018-09-08 NOTE — PATIENT INSTRUCTIONS
Primary open-angle glaucoma, left eye, mild stage  IOP is stable. Continue Latanoprost at bedtime in the left eye. Visual field and OCT completed in office today with stable results that were discussed with patient in office today.     Will have pat

## 2018-09-08 NOTE — ASSESSMENT & PLAN NOTE
IOP is stable. Continue Latanoprost at bedtime in the left eye. Visual field and OCT completed in office today with stable results that were discussed with patient in office today. Will have patient back in 4 months for a pressure check.

## 2018-09-14 DIAGNOSIS — E11.9 TYPE 2 DIABETES MELLITUS WITHOUT COMPLICATION, WITHOUT LONG-TERM CURRENT USE OF INSULIN (HCC): ICD-10-CM

## 2018-09-14 DIAGNOSIS — E78.5 DYSLIPIDEMIA: ICD-10-CM

## 2018-09-14 RX ORDER — SIMVASTATIN 20 MG
20 TABLET ORAL NIGHTLY
Qty: 90 TABLET | Refills: 3 | Status: SHIPPED | OUTPATIENT
Start: 2018-09-14 | End: 2019-06-24

## 2018-09-14 NOTE — TELEPHONE ENCOUNTER
Refill request is for a maintenance medication and has met the criteria specified in the Ambulatory Medication Refill Standing Order for eligibility, visits, laboratory, alerts and was sent to the requested pharmacy.   Confirmed with ; metformin is 5

## 2018-10-01 ENCOUNTER — NURSE ONLY (OUTPATIENT)
Dept: INTERNAL MEDICINE CLINIC | Facility: CLINIC | Age: 68
End: 2018-10-01
Payer: MEDICARE

## 2018-10-01 PROCEDURE — 90653 IIV ADJUVANT VACCINE IM: CPT | Performed by: INTERNAL MEDICINE

## 2018-10-01 PROCEDURE — G0008 ADMIN INFLUENZA VIRUS VAC: HCPCS | Performed by: INTERNAL MEDICINE

## 2018-10-24 RX ORDER — LATANOPROST 50 UG/ML
SOLUTION/ DROPS OPHTHALMIC
Qty: 3 BOTTLE | Refills: 3 | Status: SHIPPED | OUTPATIENT
Start: 2018-10-24 | End: 2019-09-14

## 2018-12-05 ENCOUNTER — APPOINTMENT (OUTPATIENT)
Dept: LAB | Age: 68
End: 2018-12-05
Attending: INTERNAL MEDICINE
Payer: MEDICARE

## 2018-12-05 DIAGNOSIS — E11.9 TYPE 2 DIABETES MELLITUS WITHOUT COMPLICATION, WITHOUT LONG-TERM CURRENT USE OF INSULIN (HCC): ICD-10-CM

## 2018-12-05 DIAGNOSIS — E78.5 DYSLIPIDEMIA: ICD-10-CM

## 2018-12-05 PROCEDURE — 36415 COLL VENOUS BLD VENIPUNCTURE: CPT

## 2018-12-05 PROCEDURE — 83036 HEMOGLOBIN GLYCOSYLATED A1C: CPT

## 2018-12-05 PROCEDURE — 80061 LIPID PANEL: CPT

## 2018-12-13 ENCOUNTER — OFFICE VISIT (OUTPATIENT)
Dept: INTERNAL MEDICINE CLINIC | Facility: CLINIC | Age: 68
End: 2018-12-13
Payer: MEDICARE

## 2018-12-13 VITALS
DIASTOLIC BLOOD PRESSURE: 64 MMHG | SYSTOLIC BLOOD PRESSURE: 120 MMHG | HEIGHT: 66.5 IN | OXYGEN SATURATION: 97 % | BODY MASS INDEX: 31.13 KG/M2 | WEIGHT: 196 LBS | TEMPERATURE: 98 F | HEART RATE: 81 BPM

## 2018-12-13 DIAGNOSIS — E11.9 TYPE 2 DIABETES MELLITUS WITHOUT COMPLICATION, WITHOUT LONG-TERM CURRENT USE OF INSULIN (HCC): ICD-10-CM

## 2018-12-13 DIAGNOSIS — Z00.00 ANNUAL PHYSICAL EXAM: Primary | ICD-10-CM

## 2018-12-13 DIAGNOSIS — R53.83 FATIGUE, UNSPECIFIED TYPE: ICD-10-CM

## 2018-12-13 DIAGNOSIS — L40.9 PSORIASIS: ICD-10-CM

## 2018-12-13 DIAGNOSIS — Z12.5 SCREENING FOR PROSTATE CANCER: ICD-10-CM

## 2018-12-13 DIAGNOSIS — I10 ESSENTIAL HYPERTENSION: ICD-10-CM

## 2018-12-13 DIAGNOSIS — E78.5 DYSLIPIDEMIA: ICD-10-CM

## 2018-12-13 PROCEDURE — G0439 PPPS, SUBSEQ VISIT: HCPCS | Performed by: INTERNAL MEDICINE

## 2018-12-13 RX ORDER — ALBUTEROL SULFATE 90 UG/1
2 AEROSOL, METERED RESPIRATORY (INHALATION) AS NEEDED
Qty: 1 INHALER | Refills: 3 | Status: SHIPPED | OUTPATIENT
Start: 2018-12-13 | End: 2020-03-16

## 2018-12-13 NOTE — PROGRESS NOTES
HPI:   Caryl Castillo is a 80year old male who presents for a Medicare Subsequent Annual Wellness visit (Pt already had Initial Annual Wellness).     He has a past medical history of HTN, type 2 DM, dyslipidemia, psoriasis, osteoarthritis, possible gla MG Oral Tab Take 1 tablet (20 mg total) by mouth nightly. Quinapril HCl (ACCUPRIL) 20 MG Oral Tab Take 1 tablet (20 mg total) by mouth daily.    [employer] MICROLET LANCETS Does not apply Misc Use to test blood glucose twice daily as directed   Glucose Blood ( as of this encounter: 5' 6.5\" (1.689 m). Weight as of this encounter: 196 lb (88.9 kg).     Medicare Hearing Assessment  (Required for AWV/SWV)    Hearing Screening    Screening Method:  Questionnaire  I have a problem hearing over the telephone:  No trachea midline, no adenopathy, thyroid: not enlarged, symmetric, no tenderness/mass/nodules, no carotid bruit or JVD   Back:   Symmetric, no curvature, ROM normal, no CVA tenderness   Lungs:   Clear to auscultation bilaterally, respirations unlabored   Ch normal colonoscopy with Dr. Dharmesh Jenkins 11/2018 (repeat due in 10 years)    Follow up in 6 months. No Follow-up on file.      Loretta Quiroz, 12/13/18, 7:40 PM        General Health     In the past six months, have you lost more than 10 pounds without trying?: 2 doing things (over the last two weeks)?: Not at all    Feeling down, depressed, or hopeless (over the last two weeks)?: Not at all    PHQ-2 SCORE: 0         Advance Directives     Do you have a healthcare power of ?: No    Do you have a living will 13 (Prevnar) Orders placed or performed in visit on 07/02/18   • PNEUMOCOCCAL VACC, 13 MARILUZ IM         Pneumococcal 23 (Pneumovax) Orders placed or performed in visit on 01/26/17   • PNEUMOCOCCAL IMM (PNEUMOVAX)        Hepatitis B No orders found for this o stable

## 2019-01-12 ENCOUNTER — OFFICE VISIT (OUTPATIENT)
Dept: OPHTHALMOLOGY | Facility: CLINIC | Age: 69
End: 2019-01-12
Payer: MEDICARE

## 2019-01-12 DIAGNOSIS — H40.1121 PRIMARY OPEN-ANGLE GLAUCOMA, LEFT EYE, MILD STAGE: Primary | ICD-10-CM

## 2019-01-12 PROCEDURE — G0463 HOSPITAL OUTPT CLINIC VISIT: HCPCS | Performed by: OPHTHALMOLOGY

## 2019-01-12 PROCEDURE — 99213 OFFICE O/P EST LOW 20 MIN: CPT | Performed by: OPHTHALMOLOGY

## 2019-01-12 NOTE — PROGRESS NOTES
Piotr Pate is a 80year old male. HPI:     HPI     Patient is here for an IOP check. He is taking Latanprost OS QHS as directed.       Last edited by Lien Messina OT on 1/12/2019  8:28 AM. (History)        Patient History:  Past Medical History: (BRANT CONTOUR NEXT TEST) In Vitro Strip Use to test blood glucose twice daily as directed Disp: 100 strip Rfl: 3   Fluocinonide 0.05 % External Ointment Apply 1 g topically 2 (two) times daily.  Disp: 60 g Rfl: 3   aspirin 81 MG Oral Tab EC Take 1 tablet ( placed in this encounter.       Meds This Visit:  Requested Prescriptions      No prescriptions requested or ordered in this encounter        Follow up instructions:  Return in about 4 months (around 5/12/2019) for Dilated exam.    1/12/2019  Scribed by: Chloe Myers

## 2019-01-12 NOTE — ASSESSMENT & PLAN NOTE
IOP is stable. Continue Latanoprost at bedtime in the left eye.    .    Will have patient back in 4 months for a dilated eye exam.

## 2019-01-12 NOTE — PATIENT INSTRUCTIONS
Primary open-angle glaucoma, left eye, mild stage  IOP is stable. Continue Latanoprost at bedtime in the left eye.    .    Will have patient back in 4 months for a dilated eye exam.

## 2019-01-28 DIAGNOSIS — E11.9 DIABETES (HCC): ICD-10-CM

## 2019-01-28 RX ORDER — BLOOD SUGAR DIAGNOSTIC
STRIP MISCELLANEOUS
Qty: 100 STRIP | Refills: 11 | Status: SHIPPED | OUTPATIENT
Start: 2019-01-28 | End: 2020-07-27

## 2019-03-25 ENCOUNTER — OFFICE VISIT (OUTPATIENT)
Dept: INTERNAL MEDICINE CLINIC | Facility: CLINIC | Age: 69
End: 2019-03-25
Payer: MEDICARE

## 2019-03-25 VITALS
WEIGHT: 199 LBS | HEART RATE: 106 BPM | BODY MASS INDEX: 32 KG/M2 | DIASTOLIC BLOOD PRESSURE: 74 MMHG | TEMPERATURE: 98 F | SYSTOLIC BLOOD PRESSURE: 126 MMHG | OXYGEN SATURATION: 97 %

## 2019-03-25 DIAGNOSIS — J40 BRONCHITIS: Primary | ICD-10-CM

## 2019-03-25 PROCEDURE — 99213 OFFICE O/P EST LOW 20 MIN: CPT | Performed by: INTERNAL MEDICINE

## 2019-03-25 PROCEDURE — G0463 HOSPITAL OUTPT CLINIC VISIT: HCPCS | Performed by: INTERNAL MEDICINE

## 2019-03-25 RX ORDER — AZITHROMYCIN 250 MG/1
TABLET, FILM COATED ORAL
Qty: 6 TABLET | Refills: 0 | Status: SHIPPED | OUTPATIENT
Start: 2019-03-25 | End: 2019-06-24 | Stop reason: ALTCHOICE

## 2019-03-25 NOTE — PROGRESS NOTES
Saranya Angelo is a 80year old male. Patient presents with:  Sore Throat: Patient reports he has had a sore throat that started Friday but has been progressively getting worse. He has a productive cough with green sputum.  Patient denies any fevers, chills, • Primary open-angle glaucoma, left eye, mild stage 8/23/2017 8//23/17 Started on Latanoprost qhs OS by Dr. Deric Paige due to thinning of the optic nerve OS on OCT    • Psoriasis    • Wears glasses       Past Surgical History:   Procedure Laterality Da

## 2019-03-29 ENCOUNTER — PATIENT MESSAGE (OUTPATIENT)
Dept: INTERNAL MEDICINE CLINIC | Facility: CLINIC | Age: 69
End: 2019-03-29

## 2019-03-29 NOTE — TELEPHONE ENCOUNTER
From: Libertad Monroy  To: Augustin Whitaker DO  Sent: 3/29/2019 9:08 AM CDT  Subject: Visit Follow-up Question    Throat is a lot better, still have a slight tickle in it which makes me cough. Sleeping better. Finishing the medicine today.  Still taking the cepa

## 2019-04-23 RX ORDER — QUINAPRIL 20 MG/1
20 TABLET ORAL DAILY
Qty: 90 TABLET | Refills: 3 | Status: SHIPPED | OUTPATIENT
Start: 2019-04-23 | End: 2020-03-16

## 2019-05-11 ENCOUNTER — OFFICE VISIT (OUTPATIENT)
Dept: OPHTHALMOLOGY | Facility: CLINIC | Age: 69
End: 2019-05-11
Payer: MEDICARE

## 2019-05-11 DIAGNOSIS — H25.13 AGE-RELATED NUCLEAR CATARACT OF BOTH EYES: ICD-10-CM

## 2019-05-11 DIAGNOSIS — H40.1121 PRIMARY OPEN-ANGLE GLAUCOMA, LEFT EYE, MILD STAGE: ICD-10-CM

## 2019-05-11 DIAGNOSIS — E11.9 TYPE 2 DIABETES MELLITUS WITHOUT RETINOPATHY (HCC): Primary | ICD-10-CM

## 2019-05-11 PROCEDURE — 92014 COMPRE OPH EXAM EST PT 1/>: CPT | Performed by: OPHTHALMOLOGY

## 2019-05-11 NOTE — PROGRESS NOTES
Jerry Spicer is a 80year old male.     HPI:     HPI     Diabetic Eye Exam      Additional comments: Pt has been a diabetic for 2 years  2 years on pills/  0 years on Insulin   Pt checks his 2 times a day   Pt's last blood sugar was 97 on 5/10/19  Last HA DIRECTED Disp: 100 strip Rfl: 11   Albuterol Sulfate  (90 Base) MCG/ACT Inhalation Aero Soln Inhale 2 puffs into the lungs as needed for Wheezing (2-3 times daily PRN).  Disp: 1 Inhaler Rfl: 3   LATANOPROST 0.005 % Ophthalmic Solution INSTILL 1 DROP Cortical cataract inferonasal   2+ Nuclear sclerosis    Vitreous Vitreous floaters Vitreous floaters          Fundus Exam       Right Left    Disc Good rim  Good rim     C/D Ratio 0.7 0.8    Macula Normal- no BDR Normal- no BDR    Vessels Normal Normal

## 2019-05-11 NOTE — ASSESSMENT & PLAN NOTE
IOP is stable. Continue Latanoprost at bedtime in the left eye. Visual field and OCT completed in office today with stable results that were discussed with patient in office today.       Will have patient back in 4 months for a pressure check and then f

## 2019-05-11 NOTE — PATIENT INSTRUCTIONS
Type 2 diabetes mellitus without retinopathy ([facility] [address]  Diabetes type II: no background of retinopathy, no signs of neovascularization noted. Discussed ocular and systemic benefits of blood sugar control.   Diagnosis and treatment discussed in detail with christianne

## 2019-06-19 ENCOUNTER — LAB ENCOUNTER (OUTPATIENT)
Dept: LAB | Age: 69
End: 2019-06-19
Attending: INTERNAL MEDICINE
Payer: MEDICARE

## 2019-06-19 DIAGNOSIS — E78.5 DYSLIPIDEMIA: ICD-10-CM

## 2019-06-19 DIAGNOSIS — Z00.00 ANNUAL PHYSICAL EXAM: ICD-10-CM

## 2019-06-19 DIAGNOSIS — Z12.5 SCREENING FOR PROSTATE CANCER: ICD-10-CM

## 2019-06-19 DIAGNOSIS — R53.83 FATIGUE, UNSPECIFIED TYPE: ICD-10-CM

## 2019-06-19 DIAGNOSIS — E11.9 TYPE 2 DIABETES MELLITUS WITHOUT COMPLICATION, WITHOUT LONG-TERM CURRENT USE OF INSULIN (HCC): ICD-10-CM

## 2019-06-19 PROCEDURE — 80061 LIPID PANEL: CPT

## 2019-06-19 PROCEDURE — 83036 HEMOGLOBIN GLYCOSYLATED A1C: CPT

## 2019-06-19 PROCEDURE — 82043 UR ALBUMIN QUANTITATIVE: CPT

## 2019-06-19 PROCEDURE — 85025 COMPLETE CBC W/AUTO DIFF WBC: CPT

## 2019-06-19 PROCEDURE — 36415 COLL VENOUS BLD VENIPUNCTURE: CPT

## 2019-06-19 PROCEDURE — 82570 ASSAY OF URINE CREATININE: CPT

## 2019-06-19 PROCEDURE — 80053 COMPREHEN METABOLIC PANEL: CPT

## 2019-06-24 ENCOUNTER — OFFICE VISIT (OUTPATIENT)
Dept: INTERNAL MEDICINE CLINIC | Facility: CLINIC | Age: 69
End: 2019-06-24
Payer: MEDICARE

## 2019-06-24 VITALS
HEART RATE: 75 BPM | SYSTOLIC BLOOD PRESSURE: 118 MMHG | TEMPERATURE: 98 F | DIASTOLIC BLOOD PRESSURE: 74 MMHG | BODY MASS INDEX: 31 KG/M2 | OXYGEN SATURATION: 98 % | WEIGHT: 197 LBS

## 2019-06-24 DIAGNOSIS — E11.9 TYPE 2 DIABETES MELLITUS WITHOUT COMPLICATION, WITHOUT LONG-TERM CURRENT USE OF INSULIN (HCC): ICD-10-CM

## 2019-06-24 DIAGNOSIS — E78.5 DYSLIPIDEMIA: ICD-10-CM

## 2019-06-24 DIAGNOSIS — I10 ESSENTIAL HYPERTENSION: Primary | ICD-10-CM

## 2019-06-24 PROCEDURE — 99214 OFFICE O/P EST MOD 30 MIN: CPT | Performed by: INTERNAL MEDICINE

## 2019-06-24 PROCEDURE — G0463 HOSPITAL OUTPT CLINIC VISIT: HCPCS | Performed by: INTERNAL MEDICINE

## 2019-06-24 RX ORDER — ASPIRIN 81 MG/1
81 TABLET ORAL DAILY
Qty: 90 TABLET | Refills: 3 | Status: SHIPPED | OUTPATIENT
Start: 2019-06-24

## 2019-06-24 RX ORDER — SIMVASTATIN 20 MG
20 TABLET ORAL NIGHTLY
Qty: 90 TABLET | Refills: 3 | Status: SHIPPED | OUTPATIENT
Start: 2019-06-24 | End: 2020-09-14

## 2019-06-24 NOTE — PROGRESS NOTES
Elmira Corrigan is a 77year old malePatient presents with:  Checkup: 6 month      HPI:     Elmira Corrigan is a 77year old male who presents for a 6 month follow up.       He has a past medical history of HTN, type 2 DM, dyslipidemia, psoriasis, osteoarthri • Primary open-angle glaucoma, left eye, mild stage 8/23/2017 8//23/17 Started on Latanoprost qhs OS by Dr. Ney Smalls due to thinning of the optic nerve OS on OCT    • Psoriasis    • Wears glasses       Past Surgical History:   Procedure Laterality Da ([address]  metformin 500mg daily. a1c 6.4    2. Dyslipidemia  aspirin 81mg daily, zocor 20mg daily. Discussed that we could stop aspirin-he will think about this. 3. Hypertension  Continue quinapril 20mg daily    4. psoriasis  Lidex prn.  Asked him to consi

## 2019-09-09 DIAGNOSIS — E78.5 DYSLIPIDEMIA: ICD-10-CM

## 2019-09-09 DIAGNOSIS — E11.9 TYPE 2 DIABETES MELLITUS WITHOUT COMPLICATION, WITHOUT LONG-TERM CURRENT USE OF INSULIN (HCC): ICD-10-CM

## 2019-09-09 RX ORDER — SIMVASTATIN 20 MG
20 TABLET ORAL NIGHTLY
Qty: 90 TABLET | Refills: 3 | Status: CANCELLED | OUTPATIENT
Start: 2019-09-09

## 2019-09-09 NOTE — TELEPHONE ENCOUNTER
Patient requested refill on Zocor. #90 with 3 refills sent in June 2019. Sent [employer] message to patient to call pharmacy for additional refills.

## 2019-09-14 ENCOUNTER — OFFICE VISIT (OUTPATIENT)
Dept: OPHTHALMOLOGY | Facility: CLINIC | Age: 69
End: 2019-09-14
Payer: MEDICARE

## 2019-09-14 DIAGNOSIS — H40.1121 PRIMARY OPEN-ANGLE GLAUCOMA, LEFT EYE, MILD STAGE: Primary | ICD-10-CM

## 2019-09-14 PROCEDURE — G0463 HOSPITAL OUTPT CLINIC VISIT: HCPCS | Performed by: OPHTHALMOLOGY

## 2019-09-14 PROCEDURE — 99213 OFFICE O/P EST LOW 20 MIN: CPT | Performed by: OPHTHALMOLOGY

## 2019-09-14 RX ORDER — LATANOPROST 50 UG/ML
SOLUTION/ DROPS OPHTHALMIC
Qty: 3 BOTTLE | Refills: 3 | Status: SHIPPED | OUTPATIENT
Start: 2019-09-14 | End: 2020-04-10

## 2019-09-14 NOTE — PROGRESS NOTES
Warren Salgado is a 77year old male. HPI:     HPI     Here for an IOP check. Pt has been using Latanoprost OS qhs as directed. Pt states that his vision is stable and has no ocular complaints.    I booked patient for a VF and OCT no MD on Sept 21, 2019 DAILY AS DIRECTED Disp: 100 each Rfl: 11   CONTOUR NEXT TEST In Vitro Strip USE ONE STRIP TO CHECK GLUCOSE TWICE DAILY AS DIRECTED Disp: 100 strip Rfl: 11   Albuterol Sulfate  (90 Base) MCG/ACT Inhalation Aero Soln Inhale 2 puffs into the lungs as n Eyes      Meds This Visit:  Requested Prescriptions     Signed Prescriptions Disp Refills   • latanoprost 0.005 % Ophthalmic Solution 3 Bottle 3     Sig: INSTILL 1 DROP INTO LEFT EYE AT BEDTIME        Follow up instructions:  Return for Next avail.  VF and

## 2019-09-14 NOTE — PATIENT INSTRUCTIONS
Primary open-angle glaucoma, left eye, mild stage  IOP is stable. Continue Latanoprost at bedtime in the left eye. Will have patient back for next available visual field and OCT with no MD and then for a 4 mos IOP.

## 2019-09-14 NOTE — ASSESSMENT & PLAN NOTE
IOP is stable. Continue Latanoprost at bedtime in the left eye. Will have patient back for next available visual field and OCT with no MD and then for a 4 mos IOP.

## 2019-09-21 ENCOUNTER — NURSE ONLY (OUTPATIENT)
Dept: OPHTHALMOLOGY | Facility: CLINIC | Age: 69
End: 2019-09-21
Payer: MEDICARE

## 2019-09-21 DIAGNOSIS — H40.1121 PRIMARY OPEN-ANGLE GLAUCOMA, LEFT EYE, MILD STAGE: ICD-10-CM

## 2019-09-21 PROCEDURE — 92083 EXTENDED VISUAL FIELD XM: CPT | Performed by: OPHTHALMOLOGY

## 2019-09-21 PROCEDURE — 92133 CPTRZD OPH DX IMG PST SGM ON: CPT | Performed by: OPHTHALMOLOGY

## 2019-09-23 ENCOUNTER — TELEPHONE (OUTPATIENT)
Dept: OPHTHALMOLOGY | Facility: CLINIC | Age: 69
End: 2019-09-23

## 2019-09-23 NOTE — PROGRESS NOTES
Jeffory Severs is a 77year old male. HPI:     HPI     Patient is here for a glaucoma work up with HVF and OCT, no M.D.     Last edited by Pedro Luis Farr on 9/21/2019 11:10 AM. (History)        Patient History:  Past Medical History:   Diagnosis Date   • Rfl: 11   Albuterol Sulfate  (90 Base) MCG/ACT Inhalation Aero Soln Inhale 2 puffs into the lungs as needed for Wheezing (2-3 times daily PRN). Disp: 1 Inhaler Rfl: 3   Fluocinonide 0.05 % External Ointment Apply 1 g topically 2 (two) times daily.  D

## 2019-10-01 ENCOUNTER — NURSE ONLY (OUTPATIENT)
Dept: INTERNAL MEDICINE CLINIC | Facility: CLINIC | Age: 69
End: 2019-10-01
Payer: MEDICARE

## 2019-10-01 DIAGNOSIS — Z23 NEED FOR VACCINATION: Primary | ICD-10-CM

## 2019-10-01 PROCEDURE — G0008 ADMIN INFLUENZA VIRUS VAC: HCPCS | Performed by: INTERNAL MEDICINE

## 2019-10-01 PROCEDURE — 90471 IMMUNIZATION ADMIN: CPT | Performed by: INTERNAL MEDICINE

## 2019-10-01 PROCEDURE — 90662 IIV NO PRSV INCREASED AG IM: CPT | Performed by: INTERNAL MEDICINE

## 2019-10-01 NOTE — PROGRESS NOTES
Pt here today for Flu Vaccine   Pt verfied name and   VIS Provided  Consent signed  Pt tolerated injection well

## 2019-10-15 ENCOUNTER — OFFICE VISIT (OUTPATIENT)
Dept: INTERNAL MEDICINE CLINIC | Facility: CLINIC | Age: 69
End: 2019-10-15
Payer: MEDICARE

## 2019-10-15 VITALS
DIASTOLIC BLOOD PRESSURE: 76 MMHG | OXYGEN SATURATION: 98 % | TEMPERATURE: 99 F | SYSTOLIC BLOOD PRESSURE: 110 MMHG | HEART RATE: 89 BPM | WEIGHT: 201 LBS | BODY MASS INDEX: 32 KG/M2

## 2019-10-15 DIAGNOSIS — J02.9 PHARYNGITIS, UNSPECIFIED ETIOLOGY: Primary | ICD-10-CM

## 2019-10-15 PROCEDURE — 99213 OFFICE O/P EST LOW 20 MIN: CPT | Performed by: INTERNAL MEDICINE

## 2019-10-15 PROCEDURE — G0463 HOSPITAL OUTPT CLINIC VISIT: HCPCS | Performed by: INTERNAL MEDICINE

## 2019-10-15 RX ORDER — AZITHROMYCIN 250 MG/1
TABLET, FILM COATED ORAL
Qty: 6 TABLET | Refills: 0 | Status: SHIPPED | OUTPATIENT
Start: 2019-10-15 | End: 2019-12-30 | Stop reason: ALTCHOICE

## 2019-10-15 NOTE — PROGRESS NOTES
Marcela Carmona is a 77year old male. Patient presents with:  Sore Throat: Patient reports sore throat and dry cough, \"from the throat not the chest\" since Sunday. Denies fevers, chills or body aches. He has been using cepachol and advil with no relief. glaucoma, left eye, mild stage 8/23/2017 8//23/17 Started on Latanoprost qhs OS by Dr. Carley Jensen due to thinning of the optic nerve OS on OCT    • Psoriasis    • Wears glasses       Past Surgical History:   Procedure Laterality Date   • COLONOSCOPY  11/2

## 2019-12-21 ENCOUNTER — APPOINTMENT (OUTPATIENT)
Dept: LAB | Age: 69
End: 2019-12-21
Attending: INTERNAL MEDICINE
Payer: MEDICARE

## 2019-12-21 DIAGNOSIS — E11.9 TYPE 2 DIABETES MELLITUS WITHOUT COMPLICATION, WITHOUT LONG-TERM CURRENT USE OF INSULIN (HCC): ICD-10-CM

## 2019-12-21 DIAGNOSIS — E78.5 DYSLIPIDEMIA: ICD-10-CM

## 2019-12-21 PROCEDURE — 80061 LIPID PANEL: CPT

## 2019-12-21 PROCEDURE — 83036 HEMOGLOBIN GLYCOSYLATED A1C: CPT

## 2019-12-21 PROCEDURE — 36415 COLL VENOUS BLD VENIPUNCTURE: CPT

## 2019-12-23 ENCOUNTER — APPOINTMENT (OUTPATIENT)
Dept: LAB | Age: 69
End: 2019-12-23
Attending: INTERNAL MEDICINE
Payer: MEDICARE

## 2019-12-30 ENCOUNTER — OFFICE VISIT (OUTPATIENT)
Dept: INTERNAL MEDICINE CLINIC | Facility: CLINIC | Age: 69
End: 2019-12-30
Payer: MEDICARE

## 2019-12-30 VITALS
HEIGHT: 66.5 IN | SYSTOLIC BLOOD PRESSURE: 126 MMHG | TEMPERATURE: 98 F | HEART RATE: 87 BPM | RESPIRATION RATE: 16 BRPM | DIASTOLIC BLOOD PRESSURE: 70 MMHG | WEIGHT: 203 LBS | OXYGEN SATURATION: 97 % | BODY MASS INDEX: 32.24 KG/M2

## 2019-12-30 DIAGNOSIS — Z13.6 SCREENING FOR CARDIOVASCULAR CONDITION: ICD-10-CM

## 2019-12-30 DIAGNOSIS — E78.5 DYSLIPIDEMIA: ICD-10-CM

## 2019-12-30 DIAGNOSIS — E11.9 TYPE 2 DIABETES MELLITUS WITHOUT COMPLICATION, WITHOUT LONG-TERM CURRENT USE OF INSULIN (HCC): Primary | ICD-10-CM

## 2019-12-30 DIAGNOSIS — Z13.1 SCREENING FOR DIABETES MELLITUS (DM): ICD-10-CM

## 2019-12-30 DIAGNOSIS — E11.65 TYPE 2 DIABETES MELLITUS WITH HYPERGLYCEMIA, WITHOUT LONG-TERM CURRENT USE OF INSULIN (HCC): ICD-10-CM

## 2019-12-30 DIAGNOSIS — I10 ESSENTIAL HYPERTENSION: ICD-10-CM

## 2019-12-30 DIAGNOSIS — Z00.00 ENCOUNTER FOR ANNUAL HEALTH EXAMINATION: ICD-10-CM

## 2019-12-30 DIAGNOSIS — Z12.5 SCREENING FOR PROSTATE CANCER: ICD-10-CM

## 2019-12-30 DIAGNOSIS — R53.83 FATIGUE, UNSPECIFIED TYPE: ICD-10-CM

## 2019-12-30 PROBLEM — E66.09 NON MORBID OBESITY DUE TO EXCESS CALORIES: Status: RESOLVED | Noted: 2017-01-29 | Resolved: 2019-12-30

## 2019-12-30 PROCEDURE — G0439 PPPS, SUBSEQ VISIT: HCPCS | Performed by: INTERNAL MEDICINE

## 2019-12-30 NOTE — PROGRESS NOTES
HPI:   Sea Still is a 77year old male who presents for a Medicare Subsequent Annual Wellness visit (Pt already had Initial Annual Wellness).     He has a past medical history of HTN, type 2 DM, dyslipidemia, psoriasis, osteoarthritis, possible gla Tab, Take 1 tablet (500 mg total) by mouth daily with breakfast.  Quinapril HCl (ACCUPRIL) 20 MG Oral Tab, Take 1 tablet (20 mg total) by mouth daily.   [employer] MICROLET LANCETS Does not apply Misc, USE TO TEST BLOOD GLUCOSE TWICE DAILY AS DIRECTED  CONTOUR N Hearing Screening    Screening Method:  Questionnaire  I have a problem hearing over the telephone:  No    I have trouble understanding things on the TV:  No I have to strain to understand conversations:  No   I have to worry about missing the telephone normal, no CVA tenderness   Lungs:   Clear to auscultation bilaterally, respirations unlabored   Chest Wall:  No tenderness or deformity   Heart:  Regular rate and rhythm, S1, S2 normal, no murmur, rub or gallop   Abdomen:   Soft, non-tender, bowel sounds for Health Care on file in [address] [place] [address] Rd. Not Discussed           PLAN:  The patient indicates understanding of these issues and agrees to the plan. 1. New onset type 2 diabetes mellitus (HCC)  metformin 500mg daily. a1c 5.9>6.5.  Will work on diet and recheck in No      Fall/Risk Assessment          Have you fallen in the last 12 months?: 0-No                                        Fall/Risk Scorin          Depression Screening (PHQ-2/PHQ-9): Over the LAST 2 WEEKS   Little interest or pleasure in doing things entered on: 5/11/2019   Last Dilated Eye Exam 5/11/2019       Prostate Cancer Screening      PSA  Annually PSA due on 06/19/2021  Update Health Maintenance if applicable   Immunizations      Influenza Orders placed or performed in visit on 10/01/19   • FLU

## 2019-12-31 NOTE — PATIENT INSTRUCTIONS
Quinten Reese's SCREENING SCHEDULE   Tests on this list are recommended by your physician but may not be covered, or covered at this frequency, by your insurer. Please check with your insurance carrier before scheduling to verify coverage.     PREVENTATI 10 years- more often if abnormal Colonoscopy due on 11/14/2028 Update [facility] if applicable    Flex Sigmoidoscopy Screen  Covered every 5 years No results found for this or any previous visit. No flowsheet data found.      Fecal Occult Blood   [place] Medicare Part B) Orders placed or performed in visit on 02/23/17   • TETANUS, DIPHTHERIA TOXOIDS AND ACELLULAR PERTUSIS VACCINE (TDAP), >7 YEARS, IM USE    This may be covered with your prescription benefits, but Medicare does not cover unless Medically ne

## 2020-01-11 ENCOUNTER — OFFICE VISIT (OUTPATIENT)
Dept: OPHTHALMOLOGY | Facility: CLINIC | Age: 70
End: 2020-01-11
Payer: MEDICARE

## 2020-01-11 DIAGNOSIS — H40.1121 PRIMARY OPEN-ANGLE GLAUCOMA, LEFT EYE, MILD STAGE: Primary | ICD-10-CM

## 2020-01-11 PROCEDURE — G0463 HOSPITAL OUTPT CLINIC VISIT: HCPCS | Performed by: OPHTHALMOLOGY

## 2020-01-11 PROCEDURE — 99213 OFFICE O/P EST LOW 20 MIN: CPT | Performed by: OPHTHALMOLOGY

## 2020-01-11 NOTE — PATIENT INSTRUCTIONS
Primary open-angle glaucoma, left eye, mild stage  IOP is stable. Continue Latanoprost at bedtime in the left eye.

## 2020-01-11 NOTE — PROGRESS NOTES
Hodan Byrne is a 77year old male. HPI:     HPI     Patient is here for an IOP check. He is taking Latanoprost OS QHS as directed.       Last edited by Aparna Sheriff OT on 1/11/2020  8:35 AM. (History)        Patient History:  Past Medical History: ONE STRIP TO CHECK GLUCOSE TWICE DAILY AS DIRECTED 100 strip 11   • Albuterol Sulfate  (90 Base) MCG/ACT Inhalation Aero Soln Inhale 2 puffs into the lungs as needed for Wheezing (2-3 times daily PRN).  1 Inhaler 3   • Fluocinonide 0.05 % External Oi Photos.     1/11/2020  Scribed by: Yareli Joel MD

## 2020-02-05 ENCOUNTER — TELEPHONE (OUTPATIENT)
Dept: INTERNAL MEDICINE CLINIC | Facility: CLINIC | Age: 70
End: 2020-02-05

## 2020-02-05 DIAGNOSIS — E11.9 DIABETES (HCC): ICD-10-CM

## 2020-02-05 NOTE — TELEPHONE ENCOUNTER
Hailey Rae requesting NEW RX for:  Microlet lancets MIS, Qty 100  ICD-10 diagnosis code  Form placed in blue folder  Tasked to [employer]

## 2020-03-16 RX ORDER — QUINAPRIL 20 MG/1
20 TABLET ORAL DAILY
Qty: 90 TABLET | Refills: 3 | Status: SHIPPED | OUTPATIENT
Start: 2020-03-16 | End: 2021-04-05

## 2020-03-16 RX ORDER — ALBUTEROL SULFATE 90 UG/1
2 AEROSOL, METERED RESPIRATORY (INHALATION) AS NEEDED
Qty: 3 INHALER | Refills: 1 | Status: SHIPPED | OUTPATIENT
Start: 2020-03-16 | End: 2021-08-31

## 2020-04-13 RX ORDER — LATANOPROST 50 UG/ML
SOLUTION/ DROPS OPHTHALMIC
Qty: 3 BOTTLE | Refills: 3 | Status: SHIPPED | OUTPATIENT
Start: 2020-04-13 | End: 2020-09-14

## 2020-04-13 NOTE — TELEPHONE ENCOUNTER
LDE: 5/11/19  Last visit: 1/11/2020  Due for: DM EE and photos   Upcoming visit: 5/9/2020    Routed to [facility]

## 2020-06-11 ENCOUNTER — PATIENT MESSAGE (OUTPATIENT)
Dept: INTERNAL MEDICINE CLINIC | Facility: CLINIC | Age: 70
End: 2020-06-11

## 2020-06-11 NOTE — TELEPHONE ENCOUNTER
There are active orders from December 2019. Confirmed with Dr. Jo-Ann Soto that these are orders needed, nothing else. mychart sent.

## 2020-06-11 NOTE — TELEPHONE ENCOUNTER
From: Lili Rutherford  To: Karley Rick DO  Sent: 6/11/2020 12:56 PM CDT  Subject: Non-Urgent Medical Question    I have an appointment with you on june 29. Are the offices open? What about the Lab? I have to have bloodwork done before seeing you.  Just want

## 2020-06-24 ENCOUNTER — LAB ENCOUNTER (OUTPATIENT)
Dept: LAB | Age: 70
End: 2020-06-24
Attending: INTERNAL MEDICINE
Payer: MEDICARE

## 2020-06-24 DIAGNOSIS — E11.65 TYPE 2 DIABETES MELLITUS WITH HYPERGLYCEMIA, WITHOUT LONG-TERM CURRENT USE OF INSULIN (HCC): ICD-10-CM

## 2020-06-24 DIAGNOSIS — E78.5 DYSLIPIDEMIA: ICD-10-CM

## 2020-06-24 DIAGNOSIS — R53.83 FATIGUE, UNSPECIFIED TYPE: ICD-10-CM

## 2020-06-24 DIAGNOSIS — Z12.5 SCREENING FOR PROSTATE CANCER: ICD-10-CM

## 2020-06-24 DIAGNOSIS — I10 ESSENTIAL HYPERTENSION: ICD-10-CM

## 2020-06-24 DIAGNOSIS — E11.9 TYPE 2 DIABETES MELLITUS WITHOUT COMPLICATION, WITHOUT LONG-TERM CURRENT USE OF INSULIN (HCC): ICD-10-CM

## 2020-06-24 PROCEDURE — 83036 HEMOGLOBIN GLYCOSYLATED A1C: CPT

## 2020-06-24 PROCEDURE — 80053 COMPREHEN METABOLIC PANEL: CPT

## 2020-06-24 PROCEDURE — 82570 ASSAY OF URINE CREATININE: CPT

## 2020-06-24 PROCEDURE — 80061 LIPID PANEL: CPT

## 2020-06-24 PROCEDURE — 82043 UR ALBUMIN QUANTITATIVE: CPT

## 2020-06-24 PROCEDURE — 36415 COLL VENOUS BLD VENIPUNCTURE: CPT

## 2020-06-24 PROCEDURE — 85025 COMPLETE CBC W/AUTO DIFF WBC: CPT

## 2020-06-29 ENCOUNTER — OFFICE VISIT (OUTPATIENT)
Dept: INTERNAL MEDICINE CLINIC | Facility: CLINIC | Age: 70
End: 2020-06-29
Payer: MEDICARE

## 2020-06-29 VITALS
OXYGEN SATURATION: 98 % | DIASTOLIC BLOOD PRESSURE: 78 MMHG | HEIGHT: 66.5 IN | BODY MASS INDEX: 31.92 KG/M2 | SYSTOLIC BLOOD PRESSURE: 124 MMHG | TEMPERATURE: 98 F | HEART RATE: 87 BPM | WEIGHT: 201 LBS

## 2020-06-29 DIAGNOSIS — I10 ESSENTIAL HYPERTENSION: ICD-10-CM

## 2020-06-29 DIAGNOSIS — E78.5 DYSLIPIDEMIA: ICD-10-CM

## 2020-06-29 DIAGNOSIS — E11.9 TYPE 2 DIABETES MELLITUS WITHOUT COMPLICATION, WITHOUT LONG-TERM CURRENT USE OF INSULIN (HCC): Primary | ICD-10-CM

## 2020-06-29 PROCEDURE — 99214 OFFICE O/P EST MOD 30 MIN: CPT | Performed by: INTERNAL MEDICINE

## 2020-06-29 PROCEDURE — G0463 HOSPITAL OUTPT CLINIC VISIT: HCPCS | Performed by: INTERNAL MEDICINE

## 2020-06-29 NOTE — PROGRESS NOTES
Carmina Salcido is a 72year old male. Patient presents with:  Checkup: 6 month      HPI:   Carmina Salcido is a 72year old male who presents for: 6 month follow up    He has a past medical history of HTN, type 2 DM, dyslipidemia, psoriasis, osteoarthritis, Diagnosis Date   • [facility]    • Dyslipidemia    • Essential hypertension    • Primary open-angle glaucoma, left eye, mild stage 8/23/2017 8//23/17 Started on Latanoprost qhs OS by Dr. Ewa Mackey due to thinning of the optic nerve OS on OCT    • P daily; closely monitor for hypotension as patient has lost weight. 4. psoriasis  Lidex prn. Asked him to consider dermatology evaluation.      5. Preventative care  UTD on tdap and pneumovax; had normal colonoscopy with Dr. Marlyn Grider 11/2018 (repeat due in

## 2020-07-02 ENCOUNTER — PATIENT MESSAGE (OUTPATIENT)
Dept: INTERNAL MEDICINE CLINIC | Facility: CLINIC | Age: 70
End: 2020-07-02

## 2020-07-02 NOTE — TELEPHONE ENCOUNTER
From: Andra Baumgarten  To: Erin Schwartz DO  Sent: 7/2/2020 12:54 PM CDT  Subject: Prescription Question    I called the pharmacy and they have not received a prescription for the extra Metformin you want me to start.

## 2020-07-02 NOTE — TELEPHONE ENCOUNTER
Reviewed 6/29 office visit note:  1.  type 2 diabetes mellitus ([facility]   a1c 5.9>6.5>6.6. increase metformin to twice daily    New eRX sent. Sent mychart to patient notifying him rx was sent.

## 2020-07-27 RX ORDER — BLOOD SUGAR DIAGNOSTIC
STRIP MISCELLANEOUS
Qty: 100 EACH | Refills: 11 | Status: SHIPPED | OUTPATIENT
Start: 2020-07-27 | End: 2021-10-12

## 2020-09-12 ENCOUNTER — OFFICE VISIT (OUTPATIENT)
Dept: OPHTHALMOLOGY | Facility: CLINIC | Age: 70
End: 2020-09-12
Payer: MEDICARE

## 2020-09-12 DIAGNOSIS — H40.1121 PRIMARY OPEN-ANGLE GLAUCOMA, LEFT EYE, MILD STAGE: Primary | ICD-10-CM

## 2020-09-12 DIAGNOSIS — H43.393 FLOATER, VITREOUS, BILATERAL: ICD-10-CM

## 2020-09-12 DIAGNOSIS — H25.13 AGE-RELATED NUCLEAR CATARACT OF BOTH EYES: ICD-10-CM

## 2020-09-12 DIAGNOSIS — E11.9 TYPE 2 DIABETES MELLITUS WITHOUT RETINOPATHY (HCC): ICD-10-CM

## 2020-09-12 PROBLEM — H40.009 GLAUCOMA SUSPECT: Status: RESOLVED | Noted: 2017-07-17 | Resolved: 2020-09-12

## 2020-09-12 PROCEDURE — 92014 COMPRE OPH EXAM EST PT 1/>: CPT | Performed by: OPHTHALMOLOGY

## 2020-09-12 PROCEDURE — 92250 FUNDUS PHOTOGRAPHY W/I&R: CPT | Performed by: OPHTHALMOLOGY

## 2020-09-12 NOTE — PATIENT INSTRUCTIONS
Type 2 diabetes mellitus without retinopathy ([facility]  Diabetes type II: no background of retinopathy, no signs of neovascularization noted. Discussed ocular and systemic benefits of blood sugar control.   Diagnosis and treatment discussed in detail with christianne

## 2020-09-12 NOTE — PROGRESS NOTES
Mikal Doll is a 72year old male.     HPI:     HPI     Diabetic Eye Exam      Additional comments: Pt has been a diabetic for 4 years       Pt's diabetes is currently controlled by pills  Pt checks BS 2x a day   Pt's last blood sugar was 104  Last HA1C mouth daily. 90 tablet 3   • Albuterol Sulfate  (90 Base) MCG/ACT Inhalation Aero Soln Inhale 2 puffs into the lungs as needed for Wheezing (2-3 times daily PRN).  3 Inhaler 1   • Alisa Microlet Lancets Does not apply Misc Test BS BID DX E 11.9 Non i Normal Normal    Cornea Clear- no K spindles Clear- no K spindles    Anterior Chamber Deep and quiet Deep and quiet    Iris No transillumination defects No transillumination defects    Lens 2+ Nuclear sclerosis, Trace Cortical cataract inferonasal   2+ Nuc

## 2020-09-12 NOTE — ASSESSMENT & PLAN NOTE
IOP is stable. Photos were taken today to document optic nerves. Continue Latanoprost at bedtime in the left eye. Will have patient back for next available glaucoma testing and then in 4 months for a pressure check.

## 2020-09-14 DIAGNOSIS — E78.5 DYSLIPIDEMIA: ICD-10-CM

## 2020-09-15 DIAGNOSIS — E78.5 DYSLIPIDEMIA: ICD-10-CM

## 2020-09-15 RX ORDER — SIMVASTATIN 20 MG
20 TABLET ORAL NIGHTLY
Qty: 90 TABLET | Refills: 2 | Status: SHIPPED | OUTPATIENT
Start: 2020-09-15 | End: 2021-06-13

## 2020-09-15 RX ORDER — SIMVASTATIN 20 MG
20 TABLET ORAL NIGHTLY
Qty: 90 TABLET | Refills: 3 | OUTPATIENT
Start: 2020-09-15

## 2020-09-15 RX ORDER — LATANOPROST 50 UG/ML
SOLUTION/ DROPS OPHTHALMIC
Qty: 3 BOTTLE | Refills: 3 | Status: SHIPPED | OUTPATIENT
Start: 2020-09-15 | End: 2021-09-11

## 2020-09-15 NOTE — TELEPHONE ENCOUNTER
Current refill request refused due to refill is either a duplicate request or has active refills at the pharmacy. Check previous templates.     Requested Prescriptions     Refused Prescriptions Disp Refills   • simvastatin (ZOCOR) 20 MG Oral Tab 90 tablet

## 2020-09-17 ENCOUNTER — PATIENT MESSAGE (OUTPATIENT)
Dept: INTERNAL MEDICINE CLINIC | Facility: CLINIC | Age: 70
End: 2020-09-17

## 2020-09-17 NOTE — TELEPHONE ENCOUNTER
From: Nico Lopez  To: Nico Wilkinson DO  Sent: 9/17/2020 12:09 PM CDT  Subject: Non-Urgent Medical Question    Is it too early to get flu shot???????????

## 2020-09-23 ENCOUNTER — IMMUNIZATION (OUTPATIENT)
Dept: INTERNAL MEDICINE CLINIC | Facility: CLINIC | Age: 70
End: 2020-09-23
Payer: MEDICARE

## 2020-09-23 DIAGNOSIS — Z23 NEED FOR VACCINATION: ICD-10-CM

## 2020-09-23 PROCEDURE — G0008 ADMIN INFLUENZA VIRUS VAC: HCPCS | Performed by: INTERNAL MEDICINE

## 2020-09-23 PROCEDURE — 90662 IIV NO PRSV INCREASED AG IM: CPT | Performed by: INTERNAL MEDICINE

## 2020-10-17 ENCOUNTER — NURSE ONLY (OUTPATIENT)
Dept: OPHTHALMOLOGY | Facility: CLINIC | Age: 70
End: 2020-10-17
Payer: MEDICARE

## 2020-10-17 DIAGNOSIS — H40.1121 PRIMARY OPEN-ANGLE GLAUCOMA, LEFT EYE, MILD STAGE: ICD-10-CM

## 2020-10-17 PROCEDURE — 92133 CPTRZD OPH DX IMG PST SGM ON: CPT | Performed by: OPHTHALMOLOGY

## 2020-10-17 PROCEDURE — 92083 EXTENDED VISUAL FIELD XM: CPT | Performed by: OPHTHALMOLOGY

## 2020-10-19 NOTE — PROGRESS NOTES
Armond Hooker is a 72year old male. HPI:     HPI     Patient is here for a glaucoma work up with HVF and OCT, no M.YISSEL.     Last edited by Chiqui Recio on 10/17/2020 10:57 AM. (History)        Patient History:  Past Medical History:   Diagnosis Date insulin dependent 100 each 11   • aspirin 81 MG Oral Tab EC Take 1 tablet (81 mg total) by mouth daily. (Patient taking differently: Take 81 mg by mouth.  Pt states he takes \"once in a while\" ) 90 tablet 3   • Fluocinonide 0.05 % External Ointment Apply 1

## 2020-10-19 NOTE — ASSESSMENT & PLAN NOTE
Normal visual field, OCT, right eye. Abnormal visual field, OCT, left eye. Continue Latanoprost, 1 drop, left eye, at bedtime.

## 2020-10-20 ENCOUNTER — TELEPHONE (OUTPATIENT)
Dept: OPHTHALMOLOGY | Facility: CLINIC | Age: 70
End: 2020-10-20

## 2020-11-18 ENCOUNTER — PATIENT MESSAGE (OUTPATIENT)
Dept: INTERNAL MEDICINE CLINIC | Facility: CLINIC | Age: 70
End: 2020-11-18

## 2020-11-18 DIAGNOSIS — Z20.822 EXPOSURE TO COVID-19 VIRUS: Primary | ICD-10-CM

## 2020-11-18 NOTE — TELEPHONE ENCOUNTER
From: Melia Rick  To: Sixto Workamn DO  Sent: 11/18/2020 9:12 AM CST  Subject: Other    I would like to schedule a Covid-19 test. I was tested on nov. 9th and was negative. My wife has it and she is home.  I wear a mask all day here since I can't go to [place]

## 2020-11-19 ENCOUNTER — APPOINTMENT (OUTPATIENT)
Dept: LAB | Age: 70
End: 2020-11-19
Attending: INTERNAL MEDICINE
Payer: MEDICARE

## 2020-11-19 DIAGNOSIS — Z20.822 EXPOSURE TO COVID-19 VIRUS: ICD-10-CM

## 2021-01-09 ENCOUNTER — OFFICE VISIT (OUTPATIENT)
Dept: OPHTHALMOLOGY | Facility: CLINIC | Age: 71
End: 2021-01-09
Payer: MEDICARE

## 2021-01-09 DIAGNOSIS — H40.1121 PRIMARY OPEN-ANGLE GLAUCOMA, LEFT EYE, MILD STAGE: Primary | ICD-10-CM

## 2021-01-09 PROCEDURE — 99213 OFFICE O/P EST LOW 20 MIN: CPT | Performed by: OPHTHALMOLOGY

## 2021-01-09 NOTE — PATIENT INSTRUCTIONS
Primary open-angle glaucoma, left eye, mild stage  IOP is stable. Continue Latanoprost at bedtime in the left eye. Will have patient back in 4 months for a pressure check.

## 2021-01-09 NOTE — PROGRESS NOTES
Marcela Carmona is a 72year old male. HPI:     HPI     Pt is here for an IOP check. Pt is taking Latanoprost left eye at bedtime as directed. Pt states vision is stable, he has no ocular complaints at this time.      Last edited by Chantel Lee OT on 1 PRN). 3 Inhaler 1   • Alisa Microlet Lancets Does not apply Misc Test BS BID DX E 11.9 Non insulin dependent 100 each 11   • aspirin 81 MG Oral Tab EC Take 1 tablet (81 mg total) by mouth daily. (Patient taking differently: Take 81 mg by mouth.  Pt states h ordered in this encounter        Follow up instructions:  Return in about 4 months (around 5/9/2021) for IOP check.     1/9/2021  Scribed by: Danita Rodriguez MD

## 2021-01-09 NOTE — ASSESSMENT & PLAN NOTE
IOP is stable. Continue Latanoprost at bedtime in the left eye. Will have patient back in 4 months for a pressure check.

## 2021-01-15 ENCOUNTER — PATIENT MESSAGE (OUTPATIENT)
Dept: INTERNAL MEDICINE CLINIC | Facility: CLINIC | Age: 71
End: 2021-01-15

## 2021-01-15 NOTE — TELEPHONE ENCOUNTER
From: Elmira Doughertyr  To: Loretta Quiroz DO  Sent: 1/15/2021 3:46 PM CST  Subject: Non-Urgent Medical Question    are you giving out the covid vaccine to people over 65 yet.

## 2021-01-15 NOTE — TELEPHONE ENCOUNTER
covid vaccine message sent via [facility]

## 2021-02-06 DIAGNOSIS — Z23 NEED FOR VACCINATION: ICD-10-CM

## 2021-02-24 ENCOUNTER — PATIENT MESSAGE (OUTPATIENT)
Dept: INTERNAL MEDICINE CLINIC | Facility: CLINIC | Age: 71
End: 2021-02-24

## 2021-02-24 NOTE — TELEPHONE ENCOUNTER
From: Piotr Pate  To: Namita Lyle DO  Sent: 2/24/2021 11:34 AM CST  Subject: Non-Urgent Medical Question    Got an email to make an appointment to get the vaccine, but have been trying for almost a month and there are no appointments available.  It is g

## 2021-03-04 ENCOUNTER — PATIENT MESSAGE (OUTPATIENT)
Dept: INTERNAL MEDICINE CLINIC | Facility: CLINIC | Age: 71
End: 2021-03-04

## 2021-03-04 NOTE — TELEPHONE ENCOUNTER
From: Halima Armstrong  To: Cuauhtemoc Brown DO  Sent: 3/4/2021 4:27 PM CST  Subject: Non-Urgent Medical Question    i am getting the one shot vaccine next week. Am allergic to penicillin. With this have any effect on me?

## 2021-04-06 RX ORDER — QUINAPRIL 20 MG/1
20 TABLET ORAL DAILY
Qty: 90 TABLET | Refills: 0 | Status: SHIPPED | OUTPATIENT
Start: 2021-04-06 | End: 2021-07-08

## 2021-05-08 ENCOUNTER — OFFICE VISIT (OUTPATIENT)
Dept: OPHTHALMOLOGY | Facility: CLINIC | Age: 71
End: 2021-05-08
Payer: MEDICARE

## 2021-05-08 DIAGNOSIS — H40.1121 PRIMARY OPEN-ANGLE GLAUCOMA, LEFT EYE, MILD STAGE: Primary | ICD-10-CM

## 2021-05-08 PROCEDURE — 99213 OFFICE O/P EST LOW 20 MIN: CPT | Performed by: OPHTHALMOLOGY

## 2021-05-08 NOTE — PROGRESS NOTES
Mihaela Stevenson is a 72year old male. HPI:     HPI     Patient is here for an IOP check. He is taking Latanoprost OS at bedtime as directed. He states vision is stable, he has no ocular complaints at this time.      Last edited by Eri Marcelino OT on lungs as needed for Wheezing (2-3 times daily PRN). 3 Inhaler 1   • Alisa Microlet Lancets Does not apply Misc Test BS BID DX E 11.9 Non insulin dependent 100 each 11   • aspirin 81 MG Oral Tab EC Take 1 tablet (81 mg total) by mouth daily.  (Patient taking Prescriptions      No prescriptions requested or ordered in this encounter        Follow up instructions:  Return in about 4 months (around 9/8/2021) for Diabetic eye exam.    5/8/2021  Scribed by: Danita Rodriguez MD

## 2021-05-08 NOTE — PATIENT INSTRUCTIONS
Primary open-angle glaucoma, left eye, mild stage  IOP is stable. Continue Latanoprost at bedtime in the left eye.    Will have patient back in 4 for a dilated, diabetic eye exam.

## 2021-05-08 NOTE — ASSESSMENT & PLAN NOTE
IOP is stable. Continue Latanoprost at bedtime in the left eye.    Will have patient back in 4 for a dilated, diabetic eye exam.

## 2021-06-08 DIAGNOSIS — Z12.5 PROSTATE CANCER SCREENING: ICD-10-CM

## 2021-06-08 DIAGNOSIS — E11.65 TYPE 2 DIABETES MELLITUS WITH HYPERGLYCEMIA, UNSPECIFIED WHETHER LONG TERM INSULIN USE (HCC): ICD-10-CM

## 2021-06-08 DIAGNOSIS — L40.9 PSORIASIS: ICD-10-CM

## 2021-06-08 DIAGNOSIS — I10 ESSENTIAL HYPERTENSION: Primary | ICD-10-CM

## 2021-06-09 RX ORDER — FLUOCINONIDE 0.5 MG/G
1 OINTMENT TOPICAL 2 TIMES DAILY
Qty: 60 G | Refills: 3 | Status: SHIPPED | OUTPATIENT
Start: 2021-06-09 | End: 2022-09-21

## 2021-06-09 NOTE — TELEPHONE ENCOUNTER
Left message for patient to call back to get more information. The topical fluocinonide was last prescribed by Dr. Juana Porras in 2017. What does patient use this for?

## 2021-06-09 NOTE — TELEPHONE ENCOUNTER
I spoke with patient. He is using the topical Lidex for psoriasis. He would like a refill sent in. He explained that he did not see dermatology. He will call back to schedule an annual physical with Dr. Gaurang Fletcher in late June or July. He will go for fasting blood work in the next few weeks. He did not go for a 6 month check up or 6 month labs. Lab orders are pended to Dr. Gaurang Fletcher for review.

## 2021-06-09 NOTE — TELEPHONE ENCOUNTER
Pt. Called back. Nurse unavailable. Pt. States he uses it for Psoriasis. Pt. Can be reached at [phone]. Ask for Joe Porter.

## 2021-06-13 DIAGNOSIS — E78.5 DYSLIPIDEMIA: ICD-10-CM

## 2021-06-15 NOTE — TELEPHONE ENCOUNTER
Patient called and scheduled a medicare annual with Dr Missy Martinez on 9/16/2021. Routed back to RX.

## 2021-06-16 RX ORDER — SIMVASTATIN 20 MG
20 TABLET ORAL NIGHTLY
Qty: 90 TABLET | Refills: 0 | Status: SHIPPED | OUTPATIENT
Start: 2021-06-16 | End: 2021-09-09

## 2021-07-09 RX ORDER — QUINAPRIL 20 MG/1
20 TABLET ORAL DAILY
Qty: 90 TABLET | Refills: 0 | Status: SHIPPED | OUTPATIENT
Start: 2021-07-09 | End: 2021-10-04

## 2021-07-09 NOTE — TELEPHONE ENCOUNTER
Patient scheduled for MA in September. Per last OV note 6/29/20:  \"3. Hypertension  Continue quinapril 20mg daily; closely monitor for hypotension as patient has lost weight.  \"      Spoke with patient who reports he has actually \"put on a few pounds

## 2021-08-03 ENCOUNTER — PATIENT MESSAGE (OUTPATIENT)
Dept: INTERNAL MEDICINE CLINIC | Facility: CLINIC | Age: 71
End: 2021-08-03

## 2021-08-03 NOTE — TELEPHONE ENCOUNTER
From: Lili Rutherford  To: Karley Rick DO  Sent: 8/3/2021 9:42 AM CDT  Subject: Prescription Question    need a refill on my metformin, but it is not listed on my prescription page. it is metformin 500 mg.  wondering why it is not listed.

## 2021-08-03 NOTE — TELEPHONE ENCOUNTER
From: Rosemarie Alexandre  To: Lewis Olivas DO  Sent: 8/3/2021 1:21 PM CDT  Subject: Prescription Question    i take the metformin twice a day. it was prescribed by Dr. Rena Altamirano 4 years ago for my diabetes.

## 2021-08-31 NOTE — TELEPHONE ENCOUNTER
Pt due for MA, has appt scheduled 9/17. Short fill given.      Refill request is for a maintenance medication and has met the criteria specified in the Ambulatory Medication Refill Standing Order for eligibility, visits, laboratory, alerts and was sent to t

## 2021-09-08 ENCOUNTER — LAB ENCOUNTER (OUTPATIENT)
Dept: LAB | Age: 71
End: 2021-09-08
Attending: INTERNAL MEDICINE
Payer: MEDICARE

## 2021-09-08 DIAGNOSIS — E11.65 TYPE 2 DIABETES MELLITUS WITH HYPERGLYCEMIA, UNSPECIFIED WHETHER LONG TERM INSULIN USE (HCC): ICD-10-CM

## 2021-09-08 DIAGNOSIS — I10 ESSENTIAL HYPERTENSION: ICD-10-CM

## 2021-09-08 DIAGNOSIS — Z12.5 PROSTATE CANCER SCREENING: ICD-10-CM

## 2021-09-08 LAB
ALBUMIN SERPL-MCNC: 3.8 G/DL (ref 3.4–5)
ALBUMIN/GLOB SERPL: 0.9 {RATIO} (ref 1–2)
ALP LIVER SERPL-CCNC: 65 U/L
ALT SERPL-CCNC: 19 U/L
ANION GAP SERPL CALC-SCNC: 7 MMOL/L (ref 0–18)
AST SERPL-CCNC: 14 U/L (ref 15–37)
BASOPHILS # BLD AUTO: 0.1 X10(3) UL (ref 0–0.2)
BASOPHILS NFR BLD AUTO: 1.3 %
BILIRUB SERPL-MCNC: 0.7 MG/DL (ref 0.1–2)
BUN BLD-MCNC: 21 MG/DL (ref 7–18)
BUN/CREAT SERPL: 18.4 (ref 10–20)
CALCIUM BLD-MCNC: 8.7 MG/DL (ref 8.5–10.1)
CHLORIDE SERPL-SCNC: 105 MMOL/L (ref 98–112)
CHOLEST SMN-MCNC: 113 MG/DL (ref ?–200)
CO2 SERPL-SCNC: 25 MMOL/L (ref 21–32)
COMPLEXED PSA SERPL-MCNC: 0.88 NG/ML (ref ?–4)
CREAT BLD-MCNC: 1.14 MG/DL
CREAT UR-SCNC: 133 MG/DL
DEPRECATED RDW RBC AUTO: 45.3 FL (ref 35.1–46.3)
EOSINOPHIL # BLD AUTO: 0.59 X10(3) UL (ref 0–0.7)
EOSINOPHIL NFR BLD AUTO: 7.8 %
ERYTHROCYTE [DISTWIDTH] IN BLOOD BY AUTOMATED COUNT: 13.2 % (ref 11–15)
EST. AVERAGE GLUCOSE BLD GHB EST-MCNC: 151 MG/DL (ref 68–126)
GLOBULIN PLAS-MCNC: 4.1 G/DL (ref 2.8–4.4)
GLUCOSE BLD-MCNC: 126 MG/DL (ref 70–99)
HBA1C MFR BLD HPLC: 6.9 % (ref ?–5.7)
HCT VFR BLD AUTO: 46.9 %
HDLC SERPL-MCNC: 33 MG/DL (ref 40–59)
HGB BLD-MCNC: 15.9 G/DL
IMM GRANULOCYTES # BLD AUTO: 0.03 X10(3) UL (ref 0–1)
IMM GRANULOCYTES NFR BLD: 0.4 %
LDLC SERPL CALC-MCNC: 60 MG/DL (ref ?–100)
LYMPHOCYTES # BLD AUTO: 2.58 X10(3) UL (ref 1–4)
LYMPHOCYTES NFR BLD AUTO: 33.9 %
M PROTEIN MFR SERPL ELPH: 7.9 G/DL (ref 6.4–8.2)
MCH RBC QN AUTO: 31.9 PG (ref 26–34)
MCHC RBC AUTO-ENTMCNC: 33.9 G/DL (ref 31–37)
MCV RBC AUTO: 94 FL
MICROALBUMIN UR-MCNC: 4.81 MG/DL
MICROALBUMIN/CREAT 24H UR-RTO: 36.2 UG/MG (ref ?–30)
MONOCYTES # BLD AUTO: 0.79 X10(3) UL (ref 0.1–1)
MONOCYTES NFR BLD AUTO: 10.4 %
NEUTROPHILS # BLD AUTO: 3.52 X10 (3) UL (ref 1.5–7.7)
NEUTROPHILS # BLD AUTO: 3.52 X10(3) UL (ref 1.5–7.7)
NEUTROPHILS NFR BLD AUTO: 46.2 %
NONHDLC SERPL-MCNC: 80 MG/DL (ref ?–130)
OSMOLALITY SERPL CALC.SUM OF ELEC: 289 MOSM/KG (ref 275–295)
PATIENT FASTING Y/N/NP: YES
PATIENT FASTING Y/N/NP: YES
PLATELET # BLD AUTO: 240 10(3)UL (ref 150–450)
POTASSIUM SERPL-SCNC: 4.4 MMOL/L (ref 3.5–5.1)
RBC # BLD AUTO: 4.99 X10(6)UL
SODIUM SERPL-SCNC: 137 MMOL/L (ref 136–145)
TRIGL SERPL-MCNC: 106 MG/DL (ref 30–149)
VLDLC SERPL CALC-MCNC: 16 MG/DL (ref 0–30)
WBC # BLD AUTO: 7.6 X10(3) UL (ref 4–11)

## 2021-09-08 PROCEDURE — 80053 COMPREHEN METABOLIC PANEL: CPT

## 2021-09-08 PROCEDURE — 82043 UR ALBUMIN QUANTITATIVE: CPT

## 2021-09-08 PROCEDURE — 85025 COMPLETE CBC W/AUTO DIFF WBC: CPT

## 2021-09-08 PROCEDURE — 82570 ASSAY OF URINE CREATININE: CPT

## 2021-09-08 PROCEDURE — 36415 COLL VENOUS BLD VENIPUNCTURE: CPT

## 2021-09-08 PROCEDURE — 83036 HEMOGLOBIN GLYCOSYLATED A1C: CPT

## 2021-09-08 PROCEDURE — 80061 LIPID PANEL: CPT

## 2021-09-09 DIAGNOSIS — E78.5 DYSLIPIDEMIA: ICD-10-CM

## 2021-09-09 RX ORDER — SIMVASTATIN 20 MG
20 TABLET ORAL NIGHTLY
Qty: 90 TABLET | Refills: 0 | Status: SHIPPED | OUTPATIENT
Start: 2021-09-09 | End: 2021-12-02

## 2021-09-11 ENCOUNTER — OFFICE VISIT (OUTPATIENT)
Dept: OPHTHALMOLOGY | Facility: CLINIC | Age: 71
End: 2021-09-11
Payer: MEDICARE

## 2021-09-11 DIAGNOSIS — H25.13 AGE-RELATED NUCLEAR CATARACT OF BOTH EYES: ICD-10-CM

## 2021-09-11 DIAGNOSIS — H43.393 FLOATER, VITREOUS, BILATERAL: ICD-10-CM

## 2021-09-11 DIAGNOSIS — H40.1121 PRIMARY OPEN-ANGLE GLAUCOMA, LEFT EYE, MILD STAGE: Primary | ICD-10-CM

## 2021-09-11 DIAGNOSIS — E11.9 TYPE 2 DIABETES MELLITUS WITHOUT RETINOPATHY (HCC): ICD-10-CM

## 2021-09-11 PROCEDURE — 92014 COMPRE OPH EXAM EST PT 1/>: CPT | Performed by: OPHTHALMOLOGY

## 2021-09-11 RX ORDER — LATANOPROST 50 UG/ML
SOLUTION/ DROPS OPHTHALMIC
Qty: 3 EACH | Refills: 3 | Status: SHIPPED | OUTPATIENT
Start: 2021-09-11

## 2021-09-11 NOTE — PROGRESS NOTES
Nancy Johns is a 80year old male.     HPI:     HPI     Diabetic Eye Exam      Additional comments: Pt has been a diabetic for 5 years       Pt's diabetes is currently controlled by pills   Pt checks BS daily   Pt's last blood sugar was 110 last night DAILY AS NEEDED FOR WHEEZING 1 each 0   • metFORMIN HCl 500 MG Oral Tab Take 1 tablet (500 mg total) by mouth 2 (two) times daily. 180 tablet 0   • Quinapril HCl (ACCUPRIL) 20 MG Oral Tab Take 1 tablet (20 mg total) by mouth daily.  90 tablet 0   • Fluocino cataract inferonasal   2+ Nuclear sclerosis    Vitreous Vitreous floaters Vitreous floaters          Fundus Exam       Right Left    Disc Good rim  Good rim     C/D Ratio 0.7 0.8    Macula Normal- no BDR Normal- no BDR    Vessels Normal Normal    Periphery

## 2021-09-11 NOTE — PATIENT INSTRUCTIONS
Primary open-angle glaucoma, left eye, mild stage  IOP is stable. Continue Latanoprost at bedtime in the left eye. Will have patient back for next available visual field and OCT with no MD, and then in 4 months for a pressure check.       Type 2 diabete

## 2021-09-11 NOTE — ASSESSMENT & PLAN NOTE
IOP is stable. Continue Latanoprost at bedtime in the left eye. Will have patient back for next available visual field and OCT with no MD, and then in 4 months for a pressure check.

## 2021-09-16 ENCOUNTER — OFFICE VISIT (OUTPATIENT)
Dept: INTERNAL MEDICINE CLINIC | Facility: CLINIC | Age: 71
End: 2021-09-16
Payer: MEDICARE

## 2021-09-16 VITALS
BODY MASS INDEX: 33.32 KG/M2 | WEIGHT: 209.81 LBS | TEMPERATURE: 98 F | DIASTOLIC BLOOD PRESSURE: 78 MMHG | HEIGHT: 66.5 IN | SYSTOLIC BLOOD PRESSURE: 122 MMHG | HEART RATE: 80 BPM | OXYGEN SATURATION: 98 %

## 2021-09-16 DIAGNOSIS — L40.9 PSORIASIS: ICD-10-CM

## 2021-09-16 DIAGNOSIS — E78.5 DYSLIPIDEMIA: Primary | ICD-10-CM

## 2021-09-16 DIAGNOSIS — Z00.00 ENCOUNTER FOR ANNUAL HEALTH EXAMINATION: ICD-10-CM

## 2021-09-16 DIAGNOSIS — E11.65 TYPE 2 DIABETES MELLITUS WITH HYPERGLYCEMIA, UNSPECIFIED WHETHER LONG TERM INSULIN USE (HCC): ICD-10-CM

## 2021-09-16 PROCEDURE — G0439 PPPS, SUBSEQ VISIT: HCPCS | Performed by: INTERNAL MEDICINE

## 2021-09-16 PROCEDURE — G0008 ADMIN INFLUENZA VIRUS VAC: HCPCS | Performed by: INTERNAL MEDICINE

## 2021-09-16 PROCEDURE — 90662 IIV NO PRSV INCREASED AG IM: CPT | Performed by: INTERNAL MEDICINE

## 2021-09-16 NOTE — PROGRESS NOTES
HPI:   Tamiko Ma is a 80year old male who presents for a Medicare Subsequent Annual Wellness visit (Pt already had Initial Annual Wellness).     He has a past medical history of HTN, type 2 DM, dyslipidemia, psoriasis, osteoarthritis, primary open DAILY AS NEEDED FOR WHEEZING  metFORMIN HCl 500 MG Oral Tab, Take 1 tablet (500 mg total) by mouth 2 (two) times daily. Quinapril HCl (ACCUPRIL) 20 MG Oral Tab, Take 1 tablet (20 mg total) by mouth daily.   Fluocinonide 0.05 % External Ointment, Apply 1 g 6.5\" (1.689 m). Weight as of this encounter: 209 lb 12.8 oz (95.2 kg).     Medicare Hearing Assessment  (Required for AWV/SWV)    Hearing Screening    Screening Method:  Questionnaire  I have a problem hearing over the telephone:  No    I have trouble u unlabored   Chest Wall:  No tenderness or deformity   Heart:  Regular rate and rhythm, S1, S2 normal, no murmur, rub or gallop   Abdomen:   Soft, non-tender, bowel sounds active all four quadrants,  no masses, no organomegaly           Extremities: Extremi had normal colonoscopy with Dr. Dharmesh Jenkins 11/2018 (repeat due in 10 years)    Follow up in 6 months. No follow-ups on file.      Loretta Quiroz, 12/13/18, 7:40 PM        General Health     In the past six months, have you lost more than 10 pounds without trying Correct    What year is it?: Correct    Recall \"Ball\": Correct    Recall \"Flag\": Correct    Recall \"Tree\": Correct       This section provided for quick review of chart, separate sheet to patient  PREVENTATIVE SERVICES  INDICATIONS AND SCHEDULE Inter any previous visit.      Tetanus Orders placed or performed in visit on 02/23/17   • TETANUS, DIPHTHERIA TOXOIDS AND ACELLULAR PERTUSIS VACCINE (TDAP), >7 YEARS, IM USE            SPECIFIC DISEASE MONITORING Internal Lab or Procedure External Lab or Procedu

## 2021-09-18 ENCOUNTER — NURSE ONLY (OUTPATIENT)
Dept: OPHTHALMOLOGY | Facility: CLINIC | Age: 71
End: 2021-09-18
Payer: MEDICARE

## 2021-09-18 DIAGNOSIS — H40.1121 PRIMARY OPEN-ANGLE GLAUCOMA, LEFT EYE, MILD STAGE: ICD-10-CM

## 2021-09-18 PROCEDURE — 92133 CPTRZD OPH DX IMG PST SGM ON: CPT | Performed by: OPHTHALMOLOGY

## 2021-09-18 PROCEDURE — 92083 EXTENDED VISUAL FIELD XM: CPT | Performed by: OPHTHALMOLOGY

## 2021-09-20 NOTE — PROGRESS NOTES
Warren Salgado is a 80year old male. HPI:     HPI     Patient is here for a glaucoma work up with HVF and OCT, no M.YISSEL.     Last edited by Astrid Núñez on 9/18/2021  9:05 AM. (History)        Patient History:  Past Medical History:   Diagnosis Date   • STRIP TO CHECK GLUCOSE TWICE DAILY 100 each 11   • Alisa Microlet Lancets Does not apply Misc Test BS BID DX E 11.9 Non insulin dependent 100 each 11   • aspirin 81 MG Oral Tab EC Take 1 tablet (81 mg total) by mouth daily. (Patient taking differently:  Thony Brown

## 2021-09-22 NOTE — PATIENT INSTRUCTIONS
Monty Reese's SCREENING SCHEDULE   Tests on this list are recommended by your physician but may not be covered, or covered at this frequency, by your insurer. Please check with your insurance carrier before scheduling to verify coverage.    PREVENTAT Pneumococcal Each vaccine ([id number] & [id number]) covered once after 65 Prevnar 13: 07/02/2018    [id number]: 01/26/2017     No recommendations at this time    Hepatitis B One screening covered for patients with certain risk factors   -  No recommendati including definitions of the different types of Advance Directives. It also has the State forms available on it's website for anyone to review and print using their home computer and printer. (the forms are also available in [place]  www. [employer] or

## 2021-10-05 RX ORDER — QUINAPRIL 20 MG/1
20 TABLET ORAL DAILY
Qty: 90 TABLET | Refills: 3 | Status: SHIPPED | OUTPATIENT
Start: 2021-10-05

## 2021-10-12 DIAGNOSIS — E11.9 DIABETES (HCC): ICD-10-CM

## 2021-10-14 RX ORDER — LANCETS
EACH MISCELLANEOUS
Qty: 100 EACH | Refills: 11 | Status: SHIPPED | OUTPATIENT
Start: 2021-10-14

## 2021-10-14 RX ORDER — BLOOD SUGAR DIAGNOSTIC
1 STRIP MISCELLANEOUS DAILY
Qty: 100 EACH | Refills: 11 | Status: SHIPPED | OUTPATIENT
Start: 2021-10-14

## 2021-10-21 ENCOUNTER — OFFICE VISIT (OUTPATIENT)
Dept: FAMILY MEDICINE CLINIC | Facility: CLINIC | Age: 71
End: 2021-10-21
Payer: MEDICARE

## 2021-10-21 VITALS
BODY MASS INDEX: 32.93 KG/M2 | HEIGHT: 67 IN | OXYGEN SATURATION: 98 % | RESPIRATION RATE: 16 BRPM | HEART RATE: 91 BPM | SYSTOLIC BLOOD PRESSURE: 134 MMHG | WEIGHT: 209.81 LBS | DIASTOLIC BLOOD PRESSURE: 77 MMHG | TEMPERATURE: 97 F

## 2021-10-21 DIAGNOSIS — J02.9 PHARYNGITIS, UNSPECIFIED ETIOLOGY: Primary | ICD-10-CM

## 2021-10-21 PROCEDURE — 99202 OFFICE O/P NEW SF 15 MIN: CPT | Performed by: NURSE PRACTITIONER

## 2021-10-21 PROCEDURE — 87880 STREP A ASSAY W/OPTIC: CPT | Performed by: NURSE PRACTITIONER

## 2021-10-21 NOTE — PROGRESS NOTES
CHIEF COMPLAINT:   Patient presents with:  Sore Throat: sore throat x2d. HPI:   Libertad Monroy is a 80year old male presents to clinic with complaint of sore throat. Patient has had for 1 days. Symptoms have been stable since onset.   Patient den History:  Social History    Tobacco Use      Smoking status: Never Smoker      Smokeless tobacco: Never Used    Vaping Use      Vaping Use: Never used    Alcohol use: No    Drug use: No       REVIEW OF SYSTEMS:   GENERAL HEALTH: good appetite  SKIN: denies viral and does not require antibiotics. *    Comfort care measures discussed. Discussed s/s of worsening infection/condition with Patient and importance of prompt medical re-evaluation including when to seek emergency care.  Patient voiced understanding irritation. Try gargling with 1 of these solutions:   · 1/4 teaspoon of salt in 1/2 cup of warm water  · An over-the-counter anesthetic gargle  Use medicine for more relief  Over-the-counter medicine can reduce sore throat symptoms.  Ask your pharmacist if information is not intended as a substitute for professional medical care. Always follow your healthcare professional's instructions. The patient/parent indicates understanding of these issues and agrees to the plan.   The patient is asked to fol

## 2021-10-22 ENCOUNTER — HOSPITAL ENCOUNTER (OUTPATIENT)
Dept: CT IMAGING | Age: 71
Discharge: HOME | End: 2021-10-22
Attending: INTERNAL MEDICINE

## 2021-10-22 DIAGNOSIS — Z13.6 SCREENING FOR CARDIOVASCULAR CONDITION: ICD-10-CM

## 2021-10-26 ENCOUNTER — PATIENT MESSAGE (OUTPATIENT)
Dept: INTERNAL MEDICINE CLINIC | Facility: CLINIC | Age: 71
End: 2021-10-26

## 2021-10-26 NOTE — TELEPHONE ENCOUNTER
From: Sea Still  Sent: 10/26/2021 2:44 PM CDT  To: [facility] Clinical Staff  Subject: results    Thanks to you Doc. Keeping me on my toes.

## 2021-10-27 NOTE — TELEPHONE ENCOUNTER
From: Jeffory Severs  To: Elena Chase, DO  Sent: 10/26/2021 4:39 PM CDT  Subject: booster shot    I got the [employer] shot 7 months ago. Would like to get a booster. What would you recommend. I've heard many different stories on which one to get.

## 2021-12-02 DIAGNOSIS — E78.5 DYSLIPIDEMIA: ICD-10-CM

## 2021-12-02 RX ORDER — SIMVASTATIN 20 MG
20 TABLET ORAL NIGHTLY
Qty: 90 TABLET | Refills: 3 | Status: SHIPPED | OUTPATIENT
Start: 2021-12-02

## 2022-01-08 ENCOUNTER — OFFICE VISIT (OUTPATIENT)
Dept: OPHTHALMOLOGY | Facility: CLINIC | Age: 72
End: 2022-01-08
Payer: MEDICARE

## 2022-01-08 DIAGNOSIS — H40.1121 PRIMARY OPEN-ANGLE GLAUCOMA, LEFT EYE, MILD STAGE: Primary | ICD-10-CM

## 2022-01-08 PROCEDURE — 99213 OFFICE O/P EST LOW 20 MIN: CPT | Performed by: OPHTHALMOLOGY

## 2022-01-08 NOTE — PROGRESS NOTES
Katey Ewing is a 80year old male. HPI:     HPI     Patient is here for an IOP check. He is taking Latanoprost, left eye at bedtime as directed. Patient states his vision is stable.       Last edited by Jason Amaya OT on 1/8/2022  7:43 AM. (Hist Take 1 tablet (20 mg total) by mouth daily. 90 tablet 3   • latanoprost 0.005 % Ophthalmic Solution INSTILL 1 DROP INTO LEFT EYE AT BEDTIME 3 each 3   • Fluocinonide 0.05 % External Ointment Apply 1 g topically 2 (two) times daily.  60 g 3   • aspirin 81 MG by: Javier Hewitt MD

## 2022-03-16 ENCOUNTER — LAB ENCOUNTER (OUTPATIENT)
Dept: LAB | Age: 72
End: 2022-03-16
Attending: INTERNAL MEDICINE
Payer: MEDICARE

## 2022-03-16 DIAGNOSIS — E11.65 TYPE 2 DIABETES MELLITUS WITH HYPERGLYCEMIA, UNSPECIFIED WHETHER LONG TERM INSULIN USE (HCC): ICD-10-CM

## 2022-03-16 DIAGNOSIS — E78.5 DYSLIPIDEMIA: ICD-10-CM

## 2022-03-16 LAB
ALBUMIN SERPL-MCNC: 4.4 G/DL (ref 3.4–5)
ALBUMIN/GLOB SERPL: 1.4 {RATIO} (ref 1–2)
ALP LIVER SERPL-CCNC: 74 U/L
ALT SERPL-CCNC: 24 U/L
ANION GAP SERPL CALC-SCNC: 6 MMOL/L (ref 0–18)
AST SERPL-CCNC: 14 U/L (ref 15–37)
BILIRUB SERPL-MCNC: 0.7 MG/DL (ref 0.1–2)
BUN BLD-MCNC: 24 MG/DL (ref 7–18)
BUN/CREAT SERPL: 18.3 (ref 10–20)
CALCIUM BLD-MCNC: 9.3 MG/DL (ref 8.5–10.1)
CHLORIDE SERPL-SCNC: 104 MMOL/L (ref 98–112)
CHOLEST SERPL-MCNC: 125 MG/DL (ref ?–200)
CO2 SERPL-SCNC: 25 MMOL/L (ref 21–32)
CREAT BLD-MCNC: 1.31 MG/DL
EST. AVERAGE GLUCOSE BLD GHB EST-MCNC: 151 MG/DL (ref 68–126)
FASTING PATIENT LIPID ANSWER: YES
FASTING STATUS PATIENT QL REPORTED: YES
GLOBULIN PLAS-MCNC: 3.2 G/DL (ref 2.8–4.4)
GLUCOSE BLD-MCNC: 142 MG/DL (ref 70–99)
HBA1C MFR BLD: 6.9 % (ref ?–5.7)
HDLC SERPL-MCNC: 40 MG/DL (ref 40–59)
LDLC SERPL CALC-MCNC: 67 MG/DL (ref ?–100)
NONHDLC SERPL-MCNC: 85 MG/DL (ref ?–130)
OSMOLALITY SERPL CALC.SUM OF ELEC: 286 MOSM/KG (ref 275–295)
POTASSIUM SERPL-SCNC: 4.6 MMOL/L (ref 3.5–5.1)
PROT SERPL-MCNC: 7.6 G/DL (ref 6.4–8.2)
SODIUM SERPL-SCNC: 135 MMOL/L (ref 136–145)
TRIGL SERPL-MCNC: 96 MG/DL (ref 30–149)
VLDLC SERPL CALC-MCNC: 14 MG/DL (ref 0–30)

## 2022-03-16 PROCEDURE — 80061 LIPID PANEL: CPT

## 2022-03-16 PROCEDURE — 83036 HEMOGLOBIN GLYCOSYLATED A1C: CPT

## 2022-03-16 PROCEDURE — 36415 COLL VENOUS BLD VENIPUNCTURE: CPT

## 2022-03-16 PROCEDURE — 80053 COMPREHEN METABOLIC PANEL: CPT

## 2022-03-23 ENCOUNTER — OFFICE VISIT (OUTPATIENT)
Dept: INTERNAL MEDICINE CLINIC | Facility: CLINIC | Age: 72
End: 2022-03-23
Payer: MEDICARE

## 2022-03-23 VITALS
WEIGHT: 203 LBS | HEART RATE: 92 BPM | DIASTOLIC BLOOD PRESSURE: 66 MMHG | SYSTOLIC BLOOD PRESSURE: 122 MMHG | HEIGHT: 67 IN | TEMPERATURE: 98 F | OXYGEN SATURATION: 96 % | BODY MASS INDEX: 31.86 KG/M2

## 2022-03-23 DIAGNOSIS — Z12.5 PROSTATE CANCER SCREENING: ICD-10-CM

## 2022-03-23 DIAGNOSIS — I10 ESSENTIAL HYPERTENSION: ICD-10-CM

## 2022-03-23 DIAGNOSIS — E78.5 DYSLIPIDEMIA: ICD-10-CM

## 2022-03-23 DIAGNOSIS — E11.9 TYPE 2 DIABETES MELLITUS WITHOUT COMPLICATION, WITHOUT LONG-TERM CURRENT USE OF INSULIN (HCC): Primary | ICD-10-CM

## 2022-03-23 DIAGNOSIS — R53.83 FATIGUE, UNSPECIFIED TYPE: ICD-10-CM

## 2022-03-23 DIAGNOSIS — L40.9 PSORIASIS: ICD-10-CM

## 2022-03-23 PROCEDURE — 99214 OFFICE O/P EST MOD 30 MIN: CPT | Performed by: INTERNAL MEDICINE

## 2022-04-25 ENCOUNTER — OFFICE VISIT (OUTPATIENT)
Dept: FAMILY MEDICINE CLINIC | Facility: CLINIC | Age: 72
End: 2022-04-25
Payer: MEDICARE

## 2022-04-25 DIAGNOSIS — H61.23 BILATERAL IMPACTED CERUMEN: Primary | ICD-10-CM

## 2022-04-25 PROCEDURE — 99213 OFFICE O/P EST LOW 20 MIN: CPT | Performed by: NURSE PRACTITIONER

## 2022-05-03 ENCOUNTER — OFFICE VISIT (OUTPATIENT)
Dept: FAMILY MEDICINE CLINIC | Facility: CLINIC | Age: 72
End: 2022-05-03
Payer: MEDICARE

## 2022-05-03 VITALS
BODY MASS INDEX: 31.86 KG/M2 | RESPIRATION RATE: 20 BRPM | WEIGHT: 203 LBS | TEMPERATURE: 97 F | OXYGEN SATURATION: 98 % | HEART RATE: 88 BPM | HEIGHT: 67 IN

## 2022-05-03 DIAGNOSIS — H61.23 BILATERAL IMPACTED CERUMEN: Primary | ICD-10-CM

## 2022-05-03 PROCEDURE — 99212 OFFICE O/P EST SF 10 MIN: CPT | Performed by: PHYSICIAN ASSISTANT

## 2022-05-14 ENCOUNTER — OFFICE VISIT (OUTPATIENT)
Dept: OPHTHALMOLOGY | Facility: CLINIC | Age: 72
End: 2022-05-14
Payer: MEDICARE

## 2022-05-14 DIAGNOSIS — H40.1121 PRIMARY OPEN-ANGLE GLAUCOMA, LEFT EYE, MILD STAGE: Primary | ICD-10-CM

## 2022-05-14 PROCEDURE — 99213 OFFICE O/P EST LOW 20 MIN: CPT | Performed by: OPHTHALMOLOGY

## 2022-05-14 NOTE — ASSESSMENT & PLAN NOTE
IOP is stable. Continue Latanoprost at bedtime in the left eye. Will have patient back  in 4 months for a dilated, diabetic eye exam and photos.  (will schedule diagnostics at that time)

## 2022-05-14 NOTE — PATIENT INSTRUCTIONS
Primary open-angle glaucoma, left eye, mild stage  IOP is stable. Continue Latanoprost at bedtime in the left eye. Will have patient back  in 4 months for a dilated, diabetic eye exam and photos.  (will schedule diagnostics at that time)

## 2022-07-17 ENCOUNTER — PATIENT MESSAGE (OUTPATIENT)
Dept: INTERNAL MEDICINE CLINIC | Facility: CLINIC | Age: 72
End: 2022-07-17

## 2022-07-20 NOTE — TELEPHONE ENCOUNTER
Background, Farzad 7/20/2022 10:15 AM CDT    Everything is back to normal. Walking fine. You know I have bad knees. so it still is the same as before. I think it would be a waste of your time since I am walking good. But if this ever happens again, I wouldn't hesitate to call you. One thought I had was, before this happened I was fighting a fever and was taking a lot of Tylenol. Could this have had an effect on my knees? Thanks for answering my email.

## 2022-07-21 ENCOUNTER — OFFICE VISIT (OUTPATIENT)
Dept: FAMILY MEDICINE CLINIC | Facility: CLINIC | Age: 72
End: 2022-07-21
Payer: MEDICARE

## 2022-07-21 VITALS
TEMPERATURE: 99 F | SYSTOLIC BLOOD PRESSURE: 138 MMHG | HEIGHT: 67 IN | OXYGEN SATURATION: 96 % | HEART RATE: 114 BPM | DIASTOLIC BLOOD PRESSURE: 68 MMHG | RESPIRATION RATE: 20 BRPM | WEIGHT: 208 LBS | BODY MASS INDEX: 32.65 KG/M2

## 2022-07-21 DIAGNOSIS — R50.9 LOW GRADE FEVER: ICD-10-CM

## 2022-07-21 DIAGNOSIS — Z20.822 EXPOSURE TO COVID-19 VIRUS: Primary | ICD-10-CM

## 2022-07-21 DIAGNOSIS — Z20.822 LAB TEST NEGATIVE FOR COVID-19 VIRUS: ICD-10-CM

## 2022-07-21 LAB
OPERATOR ID: NORMAL
RAPID SARS-COV-2 BY PCR: NOT DETECTED

## 2022-07-21 PROCEDURE — 99213 OFFICE O/P EST LOW 20 MIN: CPT | Performed by: PHYSICIAN ASSISTANT

## 2022-07-21 PROCEDURE — U0002 COVID-19 LAB TEST NON-CDC: HCPCS | Performed by: PHYSICIAN ASSISTANT

## 2022-09-10 ENCOUNTER — OFFICE VISIT (OUTPATIENT)
Dept: OPHTHALMOLOGY | Facility: CLINIC | Age: 72
End: 2022-09-10
Payer: MEDICARE

## 2022-09-10 DIAGNOSIS — H43.393 FLOATER, VITREOUS, BILATERAL: ICD-10-CM

## 2022-09-10 DIAGNOSIS — E11.9 TYPE 2 DIABETES MELLITUS WITHOUT RETINOPATHY (HCC): ICD-10-CM

## 2022-09-10 DIAGNOSIS — H25.13 AGE-RELATED NUCLEAR CATARACT OF BOTH EYES: ICD-10-CM

## 2022-09-10 DIAGNOSIS — H40.1121 PRIMARY OPEN-ANGLE GLAUCOMA, LEFT EYE, MILD STAGE: Primary | ICD-10-CM

## 2022-09-10 PROCEDURE — 92250 FUNDUS PHOTOGRAPHY W/I&R: CPT | Performed by: OPHTHALMOLOGY

## 2022-09-10 PROCEDURE — 1126F AMNT PAIN NOTED NONE PRSNT: CPT | Performed by: OPHTHALMOLOGY

## 2022-09-10 PROCEDURE — 92014 COMPRE OPH EXAM EST PT 1/>: CPT | Performed by: OPHTHALMOLOGY

## 2022-09-10 RX ORDER — LATANOPROST 50 UG/ML
SOLUTION/ DROPS OPHTHALMIC
Qty: 3 EACH | Refills: 3 | Status: SHIPPED | OUTPATIENT
Start: 2022-09-10

## 2022-09-10 NOTE — ASSESSMENT & PLAN NOTE
IOP is stable. Continue Latanoprost at bedtime in the left eye. Will have patient back for next available visual field and OCT with no MD and then in 4 months for a pressure check with refraction.

## 2022-09-10 NOTE — PATIENT INSTRUCTIONS
Primary open-angle glaucoma, left eye, mild stage  IOP is stable. Continue Latanoprost at bedtime in the left eye. Will have patient back for next available visual field and OCT with no MD and then in 4 months for a pressure check with refraction. Age-related nuclear cataract of both eyes   Discussed diagnosis of cataracts in detail with patient. Cataract surgery not indicated at this time due to vision level. Will continue to monitor yearly. Patient will call sooner than 1 year recall if they notice a change in vision. Floater, vitreous, bilateral  No treatment. Type 2 diabetes mellitus without retinopathy ([address]  Diabetes type II: no background of retinopathy, no signs of neovascularization noted. Discussed ocular and systemic benefits of blood sugar control. Diagnosis and treatment discussed in detail with patient.

## 2022-09-14 ENCOUNTER — LAB ENCOUNTER (OUTPATIENT)
Dept: LAB | Age: 72
End: 2022-09-14
Attending: INTERNAL MEDICINE
Payer: MEDICARE

## 2022-09-14 DIAGNOSIS — E11.9 TYPE 2 DIABETES MELLITUS WITHOUT COMPLICATION, WITHOUT LONG-TERM CURRENT USE OF INSULIN (HCC): ICD-10-CM

## 2022-09-14 DIAGNOSIS — R53.83 FATIGUE, UNSPECIFIED TYPE: ICD-10-CM

## 2022-09-14 DIAGNOSIS — E78.5 DYSLIPIDEMIA: ICD-10-CM

## 2022-09-14 DIAGNOSIS — Z12.5 PROSTATE CANCER SCREENING: ICD-10-CM

## 2022-09-14 LAB
ALBUMIN SERPL-MCNC: 3.8 G/DL (ref 3.4–5)
ALBUMIN/GLOB SERPL: 1 {RATIO} (ref 1–2)
ALP LIVER SERPL-CCNC: 71 U/L
ALT SERPL-CCNC: 23 U/L
ANION GAP SERPL CALC-SCNC: 12 MMOL/L (ref 0–18)
AST SERPL-CCNC: 16 U/L (ref 15–37)
BASOPHILS # BLD AUTO: 0.1 X10(3) UL (ref 0–0.2)
BASOPHILS NFR BLD AUTO: 1.3 %
BILIRUB SERPL-MCNC: 0.8 MG/DL (ref 0.1–2)
BUN BLD-MCNC: 23 MG/DL (ref 7–18)
BUN/CREAT SERPL: 17.8 (ref 10–20)
CALCIUM BLD-MCNC: 8.9 MG/DL (ref 8.5–10.1)
CHLORIDE SERPL-SCNC: 104 MMOL/L (ref 98–112)
CHOLEST SERPL-MCNC: 107 MG/DL (ref ?–200)
CO2 SERPL-SCNC: 21 MMOL/L (ref 21–32)
COMPLEXED PSA SERPL-MCNC: 0.88 NG/ML (ref ?–4)
CREAT BLD-MCNC: 1.29 MG/DL
CREAT UR-SCNC: 135 MG/DL
DEPRECATED RDW RBC AUTO: 46.1 FL (ref 35.1–46.3)
EOSINOPHIL # BLD AUTO: 0.45 X10(3) UL (ref 0–0.7)
EOSINOPHIL NFR BLD AUTO: 5.9 %
ERYTHROCYTE [DISTWIDTH] IN BLOOD BY AUTOMATED COUNT: 13.1 % (ref 11–15)
EST. AVERAGE GLUCOSE BLD GHB EST-MCNC: 151 MG/DL (ref 68–126)
FASTING PATIENT LIPID ANSWER: YES
FASTING STATUS PATIENT QL REPORTED: YES
GFR SERPLBLD BASED ON 1.73 SQ M-ARVRAT: 59 ML/MIN/1.73M2 (ref 60–?)
GLOBULIN PLAS-MCNC: 4 G/DL (ref 2.8–4.4)
GLUCOSE BLD-MCNC: 126 MG/DL (ref 70–99)
HBA1C MFR BLD: 6.9 % (ref ?–5.7)
HCT VFR BLD AUTO: 46.9 %
HDLC SERPL-MCNC: 38 MG/DL (ref 40–59)
HGB BLD-MCNC: 15.7 G/DL
IMM GRANULOCYTES # BLD AUTO: 0.04 X10(3) UL (ref 0–1)
IMM GRANULOCYTES NFR BLD: 0.5 %
LDLC SERPL CALC-MCNC: 50 MG/DL (ref ?–100)
LYMPHOCYTES # BLD AUTO: 2.39 X10(3) UL (ref 1–4)
LYMPHOCYTES NFR BLD AUTO: 31.5 %
MCH RBC QN AUTO: 31.8 PG (ref 26–34)
MCHC RBC AUTO-ENTMCNC: 33.5 G/DL (ref 31–37)
MCV RBC AUTO: 94.9 FL
MICROALBUMIN UR-MCNC: 3.6 MG/DL
MICROALBUMIN/CREAT 24H UR-RTO: 26.7 UG/MG (ref ?–30)
MONOCYTES # BLD AUTO: 0.75 X10(3) UL (ref 0.1–1)
MONOCYTES NFR BLD AUTO: 9.9 %
NEUTROPHILS # BLD AUTO: 3.86 X10 (3) UL (ref 1.5–7.7)
NEUTROPHILS # BLD AUTO: 3.86 X10(3) UL (ref 1.5–7.7)
NEUTROPHILS NFR BLD AUTO: 50.9 %
NONHDLC SERPL-MCNC: 69 MG/DL (ref ?–130)
OSMOLALITY SERPL CALC.SUM OF ELEC: 289 MOSM/KG (ref 275–295)
PLATELET # BLD AUTO: 264 10(3)UL (ref 150–450)
POTASSIUM SERPL-SCNC: 4.4 MMOL/L (ref 3.5–5.1)
PROT SERPL-MCNC: 7.8 G/DL (ref 6.4–8.2)
RBC # BLD AUTO: 4.94 X10(6)UL
SODIUM SERPL-SCNC: 137 MMOL/L (ref 136–145)
TRIGL SERPL-MCNC: 97 MG/DL (ref 30–149)
TSI SER-ACNC: 1.19 MIU/ML (ref 0.36–3.74)
VLDLC SERPL CALC-MCNC: 14 MG/DL (ref 0–30)
WBC # BLD AUTO: 7.6 X10(3) UL (ref 4–11)

## 2022-09-14 PROCEDURE — 83036 HEMOGLOBIN GLYCOSYLATED A1C: CPT

## 2022-09-14 PROCEDURE — 80061 LIPID PANEL: CPT

## 2022-09-14 PROCEDURE — 84443 ASSAY THYROID STIM HORMONE: CPT

## 2022-09-14 PROCEDURE — 36415 COLL VENOUS BLD VENIPUNCTURE: CPT

## 2022-09-14 PROCEDURE — 80053 COMPREHEN METABOLIC PANEL: CPT

## 2022-09-14 PROCEDURE — 82043 UR ALBUMIN QUANTITATIVE: CPT

## 2022-09-14 PROCEDURE — 85025 COMPLETE CBC W/AUTO DIFF WBC: CPT

## 2022-09-14 PROCEDURE — 82570 ASSAY OF URINE CREATININE: CPT

## 2022-09-17 ENCOUNTER — NURSE ONLY (OUTPATIENT)
Dept: OPHTHALMOLOGY | Facility: CLINIC | Age: 72
End: 2022-09-17
Payer: MEDICARE

## 2022-09-17 DIAGNOSIS — H40.1121 PRIMARY OPEN ANGLE GLAUCOMA (POAG) OF LEFT EYE, MILD STAGE: Primary | ICD-10-CM

## 2022-09-17 PROCEDURE — 92083 EXTENDED VISUAL FIELD XM: CPT | Performed by: OPHTHALMOLOGY

## 2022-09-17 PROCEDURE — 92133 CPTRZD OPH DX IMG PST SGM ON: CPT | Performed by: OPHTHALMOLOGY

## 2022-09-20 ENCOUNTER — TELEPHONE (OUTPATIENT)
Dept: OPHTHALMOLOGY | Facility: CLINIC | Age: 72
End: 2022-09-20

## 2022-09-21 ENCOUNTER — OFFICE VISIT (OUTPATIENT)
Dept: INTERNAL MEDICINE CLINIC | Facility: CLINIC | Age: 72
End: 2022-09-21

## 2022-09-21 VITALS
SYSTOLIC BLOOD PRESSURE: 122 MMHG | DIASTOLIC BLOOD PRESSURE: 72 MMHG | TEMPERATURE: 98 F | WEIGHT: 207.19 LBS | BODY MASS INDEX: 32.52 KG/M2 | OXYGEN SATURATION: 99 % | HEIGHT: 67 IN | HEART RATE: 81 BPM

## 2022-09-21 DIAGNOSIS — E78.5 DYSLIPIDEMIA: ICD-10-CM

## 2022-09-21 DIAGNOSIS — E11.9 TYPE 2 DIABETES MELLITUS WITHOUT COMPLICATION, WITHOUT LONG-TERM CURRENT USE OF INSULIN (HCC): ICD-10-CM

## 2022-09-21 DIAGNOSIS — Z00.00 ENCOUNTER FOR ANNUAL HEALTH EXAMINATION: Primary | ICD-10-CM

## 2022-09-21 DIAGNOSIS — L40.9 PSORIASIS: ICD-10-CM

## 2022-09-21 DIAGNOSIS — I10 ESSENTIAL HYPERTENSION: ICD-10-CM

## 2022-09-21 DIAGNOSIS — E11.65 TYPE 2 DIABETES MELLITUS WITH HYPERGLYCEMIA, UNSPECIFIED WHETHER LONG TERM INSULIN USE (HCC): ICD-10-CM

## 2022-09-21 PROCEDURE — 1126F AMNT PAIN NOTED NONE PRSNT: CPT | Performed by: INTERNAL MEDICINE

## 2022-09-21 PROCEDURE — G0439 PPPS, SUBSEQ VISIT: HCPCS | Performed by: INTERNAL MEDICINE

## 2022-09-21 RX ORDER — QUINAPRIL 20 MG/1
20 TABLET ORAL DAILY
Qty: 90 TABLET | Refills: 3 | Status: SHIPPED | OUTPATIENT
Start: 2022-09-21

## 2022-09-21 RX ORDER — ASPIRIN 81 MG/1
81 TABLET ORAL DAILY
Qty: 90 TABLET | Refills: 3 | Status: SHIPPED | OUTPATIENT
Start: 2022-09-21

## 2022-09-21 RX ORDER — FLUOCINONIDE 0.5 MG/G
1 OINTMENT TOPICAL 2 TIMES DAILY
Qty: 60 G | Refills: 3 | Status: SHIPPED | OUTPATIENT
Start: 2022-09-21

## 2022-09-21 RX ORDER — SIMVASTATIN 20 MG
20 TABLET ORAL NIGHTLY
Qty: 90 TABLET | Refills: 3 | Status: SHIPPED | OUTPATIENT
Start: 2022-09-21

## 2022-09-22 ENCOUNTER — PATIENT MESSAGE (OUTPATIENT)
Dept: INTERNAL MEDICINE CLINIC | Facility: CLINIC | Age: 72
End: 2022-09-22

## 2022-09-22 NOTE — TELEPHONE ENCOUNTER
From: Ethel Stuart  To: Jessica Simms DO  Sent: 9/22/2022 12:24 PM CDT  Subject: Blood tests    Did you schedule blood work for next march.

## 2022-10-03 ENCOUNTER — PATIENT MESSAGE (OUTPATIENT)
Dept: INTERNAL MEDICINE CLINIC | Facility: CLINIC | Age: 72
End: 2022-10-03

## 2022-10-04 NOTE — TELEPHONE ENCOUNTER
From: Audrey Alonzo  To: Hugo Schuster DO  Sent: 10/3/2022 11:54 AM CDT  Subject: flu shot    do you have the flu vaccine yet?

## 2022-10-13 ENCOUNTER — PATIENT MESSAGE (OUTPATIENT)
Dept: INTERNAL MEDICINE CLINIC | Facility: CLINIC | Age: 72
End: 2022-10-13

## 2022-10-14 NOTE — TELEPHONE ENCOUNTER
From: Fiorella Simeon  To: Olesya Gage DO  Sent: 10/3/2022 11:54 AM CDT  Subject: flu shot    do you have the flu vaccine yet?

## 2022-10-14 NOTE — TELEPHONE ENCOUNTER
Farzad message sent to pt advising to call to schedule. Confirmed with Elodia Herrera ok to schedule when pt calls.

## 2022-10-17 ENCOUNTER — IMMUNIZATION (OUTPATIENT)
Dept: INTERNAL MEDICINE CLINIC | Facility: CLINIC | Age: 72
End: 2022-10-17
Payer: MEDICARE

## 2022-10-17 DIAGNOSIS — Z23 NEED FOR VACCINATION: Primary | ICD-10-CM

## 2022-10-17 PROCEDURE — 90662 IIV NO PRSV INCREASED AG IM: CPT | Performed by: INTERNAL MEDICINE

## 2022-10-17 PROCEDURE — G0008 ADMIN INFLUENZA VIRUS VAC: HCPCS | Performed by: INTERNAL MEDICINE

## 2022-10-22 NOTE — TELEPHONE ENCOUNTER
To FD---    Please call patient to schedule annual OV then back to clinical for refill. non-distended/non-tender

## 2022-12-16 ENCOUNTER — TELEPHONE (OUTPATIENT)
Dept: INTERNAL MEDICINE CLINIC | Facility: CLINIC | Age: 72
End: 2022-12-16

## 2022-12-16 ENCOUNTER — PATIENT MESSAGE (OUTPATIENT)
Dept: INTERNAL MEDICINE CLINIC | Facility: CLINIC | Age: 72
End: 2022-12-16

## 2022-12-16 NOTE — TELEPHONE ENCOUNTER
----- Message from Julián Reese sent at 12/16/2022  8:46 AM CST -----  Regarding: arthritis  what can you recommend for arthritis pain. I have it my right hand bad enough that I cant even  anything. On a scale of 1-10, I'd say its a 15. Can't get a goods nights rest either because of it.

## 2022-12-16 NOTE — TELEPHONE ENCOUNTER
To  - called patient who has arthritis pain- taking arthritis pain medication OTC - he is much better now. Rn also explained that it is usually worse in the morning  and gets better once patients start moving , due to joint stiffness.  Patient wants to know what else he can do

## 2022-12-16 NOTE — TELEPHONE ENCOUNTER
From: Msua Marina  To: Jasmyn Childers DO  Sent: 12/16/2022 8:46 AM CST  Subject: arthritis    what can you recommend for arthritis pain. I have it my right hand bad enough that I cant even  anything. On a scale of 1-10, I'd say its a 15. Can't get a goods nights rest either because of it.

## 2022-12-28 ENCOUNTER — TELEPHONE (OUTPATIENT)
Dept: INTERNAL MEDICINE CLINIC | Facility: CLINIC | Age: 72
End: 2022-12-28

## 2022-12-28 ENCOUNTER — PATIENT MESSAGE (OUTPATIENT)
Dept: INTERNAL MEDICINE CLINIC | Facility: CLINIC | Age: 72
End: 2022-12-28

## 2022-12-28 NOTE — TELEPHONE ENCOUNTER
Respiratory infection triage:    Fever:  [x]  No fever  []  Fever>100.4    Cough:  [] Tight cough  [] Cough with exertion  [x] Dry cough: comes and goes[] Sputum production, Color:     Breathing:  [] Mild shortness of breath interfering with activity  [] Wheezing  [] Pain with deep breathing  [x] Using inhaler    Other symptoms:  [] Sore throat  [] Difficulty swallowing  [] Nasal drainage/congestion  [x] Sinus congestion/pressure: postnasal drip [] Ear pain  [] Body aches  [] Poor appetite  [] Loss of sense of smell   [] Loss of sense of taste  []Conjunctivitis? [] Any recent travel? [] Any sick contacts? [] Are you a healthcare worker? ADDITIONAL NOTES:  Please advise - called patient who started with symptoms 3 weeks ago, using Flonase and per DR. Adry thayer- states his cough is better than it was , - to DR. GUNTER          Notified patient that we will route this message to the doctor and see what their recommendations would be. In the meantime, if anything worsens, they were advised to call back or seek emergent evaluation.

## 2022-12-28 NOTE — TELEPHONE ENCOUNTER
----- Message from Bethel Reese sent at 12/28/2022  8:33 AM CST -----  Regarding: results  I've had a cough for the last 3 weeks. comes and goes. been taking Safe Tussin(can't find Robitussin sugar free.) Its [employer] brand with no sugar. Also feels like I have post nasal drip which may contribute to the cough. Need your expert advice.

## 2023-01-14 ENCOUNTER — OFFICE VISIT (OUTPATIENT)
Dept: OPHTHALMOLOGY | Facility: CLINIC | Age: 73
End: 2023-01-14

## 2023-01-14 DIAGNOSIS — H25.13 AGE-RELATED NUCLEAR CATARACT OF BOTH EYES: ICD-10-CM

## 2023-01-14 DIAGNOSIS — H52.203 MYOPIA OF BOTH EYES WITH ASTIGMATISM AND PRESBYOPIA: ICD-10-CM

## 2023-01-14 DIAGNOSIS — H40.1121 PRIMARY OPEN ANGLE GLAUCOMA (POAG) OF LEFT EYE, MILD STAGE: Primary | ICD-10-CM

## 2023-01-14 DIAGNOSIS — H52.4 MYOPIA OF BOTH EYES WITH ASTIGMATISM AND PRESBYOPIA: ICD-10-CM

## 2023-01-14 DIAGNOSIS — H52.13 MYOPIA OF BOTH EYES WITH ASTIGMATISM AND PRESBYOPIA: ICD-10-CM

## 2023-01-14 PROCEDURE — 1126F AMNT PAIN NOTED NONE PRSNT: CPT | Performed by: OPHTHALMOLOGY

## 2023-01-14 PROCEDURE — 92015 DETERMINE REFRACTIVE STATE: CPT | Performed by: OPHTHALMOLOGY

## 2023-01-14 PROCEDURE — 99213 OFFICE O/P EST LOW 20 MIN: CPT | Performed by: OPHTHALMOLOGY

## 2023-01-14 NOTE — PATIENT INSTRUCTIONS
Primary open angle glaucoma (POAG) of left eye, mild stage  IOP is stable. Continue Latanoprost at bedtime in the left eye. Will have patient back in 4 months for a pressure check. Myopia of both eyes with astigmatism and presbyopia  New glasses today; suggest update. Age-related nuclear cataract of both eyes  No treatment. New glasses today to update as needed.

## 2023-03-15 NOTE — ASSESSMENT & PLAN NOTE
Diabetes type II: no background of retinopathy, no signs of neovascularization noted. Discussed ocular and systemic benefits of blood sugar control. Diagnosis and treatment discussed in detail with patient. no edema,  no murmurs,  regular rate and rhythm

## 2023-03-24 ENCOUNTER — PATIENT MESSAGE (OUTPATIENT)
Dept: INTERNAL MEDICINE CLINIC | Facility: CLINIC | Age: 73
End: 2023-03-24

## 2023-03-24 RX ORDER — QUINAPRIL 20 MG/1
20 TABLET ORAL DAILY
Qty: 90 TABLET | Refills: 1 | Status: SHIPPED | OUTPATIENT
Start: 2023-03-24

## 2023-03-24 NOTE — TELEPHONE ENCOUNTER
From: Sabrina Fink  To: Marquis Luzma DO  Sent: 3/24/2023 1:34 PM CDT  Subject: [facility]ill of [facility] out of quinnipril for the next 2 months, but [facility] in [place] has it. they need a prescription from you to fill it. [facility] # [phone]. Usually get a 3 months supply.     thanks

## 2023-03-24 NOTE — TELEPHONE ENCOUNTER
Medication sent to [facility] in [place] per patient request. Zainab Matson message sent

## 2023-03-25 ENCOUNTER — LAB ENCOUNTER (OUTPATIENT)
Dept: LAB | Age: 73
End: 2023-03-25
Attending: INTERNAL MEDICINE
Payer: MEDICARE

## 2023-03-25 DIAGNOSIS — E78.5 DYSLIPIDEMIA: ICD-10-CM

## 2023-03-25 DIAGNOSIS — E11.9 TYPE 2 DIABETES MELLITUS WITHOUT COMPLICATION, WITHOUT LONG-TERM CURRENT USE OF INSULIN (HCC): ICD-10-CM

## 2023-03-25 LAB
ALBUMIN SERPL-MCNC: 4 G/DL (ref 3.4–5)
ALBUMIN/GLOB SERPL: 1.1 {RATIO} (ref 1–2)
ALP LIVER SERPL-CCNC: 73 U/L
ALT SERPL-CCNC: 23 U/L
ANION GAP SERPL CALC-SCNC: 6 MMOL/L (ref 0–18)
AST SERPL-CCNC: 19 U/L (ref 15–37)
BILIRUB SERPL-MCNC: 0.7 MG/DL (ref 0.1–2)
BUN BLD-MCNC: 20 MG/DL (ref 7–18)
BUN/CREAT SERPL: 14.9 (ref 10–20)
CALCIUM BLD-MCNC: 9.5 MG/DL (ref 8.5–10.1)
CHLORIDE SERPL-SCNC: 104 MMOL/L (ref 98–112)
CO2 SERPL-SCNC: 27 MMOL/L (ref 21–32)
CREAT BLD-MCNC: 1.34 MG/DL
EST. AVERAGE GLUCOSE BLD GHB EST-MCNC: 169 MG/DL (ref 68–126)
FASTING STATUS PATIENT QL REPORTED: YES
GFR SERPLBLD BASED ON 1.73 SQ M-ARVRAT: 57 ML/MIN/1.73M2 (ref 60–?)
GLOBULIN PLAS-MCNC: 3.8 G/DL (ref 2.8–4.4)
GLUCOSE BLD-MCNC: 158 MG/DL (ref 70–99)
HBA1C MFR BLD: 7.5 % (ref ?–5.7)
OSMOLALITY SERPL CALC.SUM OF ELEC: 290 MOSM/KG (ref 275–295)
POTASSIUM SERPL-SCNC: 4.6 MMOL/L (ref 3.5–5.1)
PROT SERPL-MCNC: 7.8 G/DL (ref 6.4–8.2)
SODIUM SERPL-SCNC: 137 MMOL/L (ref 136–145)

## 2023-03-25 PROCEDURE — 36415 COLL VENOUS BLD VENIPUNCTURE: CPT

## 2023-03-25 PROCEDURE — 83036 HEMOGLOBIN GLYCOSYLATED A1C: CPT

## 2023-03-25 PROCEDURE — 80053 COMPREHEN METABOLIC PANEL: CPT

## 2023-03-31 ENCOUNTER — OFFICE VISIT (OUTPATIENT)
Dept: INTERNAL MEDICINE CLINIC | Facility: CLINIC | Age: 73
End: 2023-03-31

## 2023-03-31 ENCOUNTER — TELEPHONE (OUTPATIENT)
Dept: INTERNAL MEDICINE CLINIC | Facility: CLINIC | Age: 73
End: 2023-03-31

## 2023-03-31 VITALS
DIASTOLIC BLOOD PRESSURE: 64 MMHG | OXYGEN SATURATION: 97 % | BODY MASS INDEX: 32.33 KG/M2 | SYSTOLIC BLOOD PRESSURE: 116 MMHG | HEART RATE: 83 BPM | TEMPERATURE: 98 F | WEIGHT: 206 LBS | HEIGHT: 67 IN

## 2023-03-31 DIAGNOSIS — Z12.5 PROSTATE CANCER SCREENING: ICD-10-CM

## 2023-03-31 DIAGNOSIS — E11.9 TYPE 2 DIABETES MELLITUS WITHOUT COMPLICATION, WITHOUT LONG-TERM CURRENT USE OF INSULIN (HCC): Primary | ICD-10-CM

## 2023-03-31 DIAGNOSIS — R53.83 FATIGUE, UNSPECIFIED TYPE: ICD-10-CM

## 2023-03-31 DIAGNOSIS — I10 ESSENTIAL HYPERTENSION: ICD-10-CM

## 2023-03-31 DIAGNOSIS — E78.5 DYSLIPIDEMIA: ICD-10-CM

## 2023-03-31 PROCEDURE — 99214 OFFICE O/P EST MOD 30 MIN: CPT | Performed by: INTERNAL MEDICINE

## 2023-03-31 PROCEDURE — 1126F AMNT PAIN NOTED NONE PRSNT: CPT | Performed by: INTERNAL MEDICINE

## 2023-03-31 RX ORDER — METFORMIN HYDROCHLORIDE 500 MG/1
500 TABLET, EXTENDED RELEASE ORAL 2 TIMES DAILY WITH MEALS
Qty: 180 TABLET | Refills: 3 | Status: CANCELLED | OUTPATIENT
Start: 2023-03-31

## 2023-03-31 RX ORDER — METFORMIN HYDROCHLORIDE 1000 MG/1
1000 TABLET, FILM COATED, EXTENDED RELEASE ORAL
Qty: 180 TABLET | Refills: 3 | Status: SHIPPED | OUTPATIENT
Start: 2023-03-31 | End: 2023-03-31

## 2023-03-31 NOTE — TELEPHONE ENCOUNTER
Please notify [facility]---changed/corrected rx is now sent for the regular metformin (not ER)  1000mg bid

## 2023-03-31 NOTE — TELEPHONE ENCOUNTER
Dr Romy Bhardwaj, patient's MyChart message pasted below. There seems to be some confusion on the new prescription for the metformin. the pharmacy called and said that it was for a 1000mg pill once a day and the extended release version which is $500. I thought it was supposed to be a 1000mg pill twice a day and not the extended version.

## 2023-03-31 NOTE — TELEPHONE ENCOUNTER
[facility] is calling regarding script called in today for Metformin 1000 mg. The cost is $500 for 180 tab    Requesting the medication be changed to Metformin 500 mg ER twice daily. The cost will be cheaper.     Will Dr Maybelle Pinon call in a new script

## 2023-03-31 NOTE — TELEPHONE ENCOUNTER
Patient notified via [facility] of new Rx for Metformin that was sent to [facility]. Pharmacist called and made aware of new Rx.

## 2023-05-13 ENCOUNTER — OFFICE VISIT (OUTPATIENT)
Dept: OPHTHALMOLOGY | Facility: CLINIC | Age: 73
End: 2023-05-13

## 2023-05-13 DIAGNOSIS — H40.1121 PRIMARY OPEN ANGLE GLAUCOMA (POAG) OF LEFT EYE, MILD STAGE: Primary | ICD-10-CM

## 2023-05-13 PROCEDURE — 99213 OFFICE O/P EST LOW 20 MIN: CPT | Performed by: OPHTHALMOLOGY

## 2023-05-13 PROCEDURE — 1126F AMNT PAIN NOTED NONE PRSNT: CPT | Performed by: OPHTHALMOLOGY

## 2023-05-13 NOTE — ASSESSMENT & PLAN NOTE
IOP is stable. Continue Latanoprost at bedtime in the left eye.    Will have patient back in 4 months for a DM EE ( then VF and OCT next available in September )

## 2023-05-13 NOTE — PATIENT INSTRUCTIONS
Primary open angle glaucoma (POAG) of left eye, mild stage  IOP is stable. Continue Latanoprost at bedtime in the left eye.    Will have patient back in 4 months for a DM EE ( then VF and OCT next available in September )

## 2023-06-26 ENCOUNTER — PATIENT MESSAGE (OUTPATIENT)
Dept: INTERNAL MEDICINE CLINIC | Facility: CLINIC | Age: 73
End: 2023-06-26

## 2023-06-27 RX ORDER — LISINOPRIL 20 MG/1
20 TABLET ORAL DAILY
Qty: 90 TABLET | Refills: 3 | Status: SHIPPED | OUTPATIENT
Start: 2023-06-27

## 2023-08-22 ENCOUNTER — PATIENT MESSAGE (OUTPATIENT)
Dept: INTERNAL MEDICINE CLINIC | Facility: CLINIC | Age: 73
End: 2023-08-22

## 2023-08-22 NOTE — TELEPHONE ENCOUNTER
From: Audrey Post  To: Hugo Schuster DO  Sent: 8/22/2023 11:30 AM CDT  Subject: RSV SHOT    What's your thoughts on getting the RSV shot.

## 2023-09-09 ENCOUNTER — OFFICE VISIT (OUTPATIENT)
Dept: OPHTHALMOLOGY | Facility: CLINIC | Age: 73
End: 2023-09-09

## 2023-09-09 DIAGNOSIS — E11.65 TYPE 2 DIABETES MELLITUS WITH HYPERGLYCEMIA, WITHOUT LONG-TERM CURRENT USE OF INSULIN (HCC): ICD-10-CM

## 2023-09-09 DIAGNOSIS — H25.13 AGE-RELATED NUCLEAR CATARACT OF BOTH EYES: ICD-10-CM

## 2023-09-09 DIAGNOSIS — H43.393 FLOATER, VITREOUS, BILATERAL: ICD-10-CM

## 2023-09-09 DIAGNOSIS — H40.1121 PRIMARY OPEN ANGLE GLAUCOMA (POAG) OF LEFT EYE, MILD STAGE: Primary | ICD-10-CM

## 2023-09-09 PROCEDURE — 1126F AMNT PAIN NOTED NONE PRSNT: CPT | Performed by: OPHTHALMOLOGY

## 2023-09-09 PROCEDURE — 92014 COMPRE OPH EXAM EST PT 1/>: CPT | Performed by: OPHTHALMOLOGY

## 2023-09-09 PROCEDURE — 92015 DETERMINE REFRACTIVE STATE: CPT | Performed by: OPHTHALMOLOGY

## 2023-09-09 RX ORDER — LATANOPROST 50 UG/ML
SOLUTION/ DROPS OPHTHALMIC
Qty: 3 EACH | Refills: 3 | Status: SHIPPED | OUTPATIENT
Start: 2023-09-09

## 2023-09-09 NOTE — ASSESSMENT & PLAN NOTE
IOP is stable. Continue Latanoprost at bedtime in the left eye.      Will have patient back for next available visual field and OCT with no MD then 4 months for a pressure check

## 2023-09-09 NOTE — PROGRESS NOTES
Rick Howard is a 68year old male. HPI:     HPI    Pt in today for a diabetic eye exam. Per pt is taking Latanoprost in the left eye at bedtime as directed. Pt states vision is stable (not very good but is not sure about cataract surgery) and denies any ocular issues. Pt has been a diabetic for 5+ years       Pt's diabetes is currently controlled by pills   Pt checks BS 2x a day    Pt's last blood sugar was 110 this morning   Last HA1C was 7.5 on 3/25/23  Endocrinologist: none  Last edited by Gigi Rosario OT on 9/9/2023  8:05 AM.        Patient History:  Past Medical History:   Diagnosis Date    Diabetes ([facility]     Dyslipidemia     Essential hypertension     Primary open-angle glaucoma, left eye, mild stage 8/23/2017 8//23/17 Started on Latanoprost qhs OS by Dr. Estefany Lopez due to thinning of the optic nerve OS on OCT     Psoriasis     Wears glasses        Surgical History: Rick Howard has a past surgical history that includes colonoscopy (11/2018) and colonoscopy (N/A, 11/14/2018) (Procedure: COLONOSCOPY, POSSIBLE BIOPSY, POSSIBLE POLYPECTOMY 92267;  Surgeon: Duane Flores MD;  Location: [address] [place] [address]). Family History   Problem Relation Age of Onset    Cancer Father         unknown    Heart Disorder Mother 80        heart attack    Stroke Sister 46    Diabetes Neg     Glaucoma Neg     Macular degeneration Neg        Social History:   Social History     Socioeconomic History    Marital status:    Tobacco Use    Smoking status: Never    Smokeless tobacco: Never   Vaping Use    Vaping Use: Never used   Substance and Sexual Activity    Alcohol use: No    Drug use: No       Medications:  Current Outpatient Medications   Medication Sig Dispense Refill    latanoprost 0.005 % Ophthalmic Solution INSTILL 1 DROP INTO LEFT EYE AT BEDTIME 3 each 3    lisinopril 20 MG Oral Tab Take 1 tablet (20 mg total) by mouth daily.  90 tablet 3    metFORMIN HCl 1000 MG Oral Tab Take 1 tablet (1,000 mg total) by mouth 2 (two) times daily with meals. 180 tablet 3    simvastatin (ZOCOR) 20 MG Oral Tab Take 1 tablet (20 mg total) by mouth nightly. 90 tablet 3    aspirin 81 MG Oral Tab EC Take 1 tablet (81 mg total) by mouth daily. 90 tablet 3    fluocinonide 0.05 % External Ointment Apply 1 g topically 2 (two) times daily. 60 g 3    VENTOLIN  (90 Base) MCG/ACT Inhalation Aero Soln Inhale 2 puffs into the lungs 3 (three) times daily as needed for Wheezing. 1 each 5    Microlet Lancets ([employer] MICROLET LANCETS) Does not apply Misc Test BS daily DX E 11.9 Non insulin dependent 100 each 11    Glucose Blood (CONTOUR NEXT TEST) In Vitro Strip 1 each by Other route daily.  100 each 11       Allergies:    Penicillins             HIVES    ROS:       PHYSICAL EXAM:     Base Eye Exam       Visual Acuity (Snellen - Linear)         Right Left    Dist cc 20/60 -2 20/40 -1    Dist ph cc 20/40 -2 20/30 -2    Near cc 20/50- 20/30-      Correction: Glasses              Tonometry (Applanation, 8:16 AM)         Right Left    Pressure 18 18              Pachymetry (8/23/2017)         Right Left    Thickness 571/-1.5 572.-1.5              Pupils         Pupils    Right PERRL    Left PERRL              Visual Fields         Left Right     Full Full              Extraocular Movement         Right Left     Full, Ortho Full, Ortho              Neuro/Psych       Oriented x3: Yes    Mood/Affect: Normal              Dilation       Both eyes: 1.0% Mydriacyl and 2.5% Audie Synephrine @ 8:16 AM                  Slit Lamp and Fundus Exam       External Exam         Right Left    External Normal Normal              Slit Lamp Exam         Right Left    Lids/Lashes Dermatochalasis, Meibomian gland dysfunction Papilloma JABARI LLL, Dermatochalasis, Meibomian gland dysfunction    Conjunctiva/Sclera Normal Normal    Cornea Clear- no K spindles Clear- no K spindles    Anterior Chamber Deep and quiet Deep and quiet    Iris No transillumination defects No transillumination defects    Lens 3+ Nuclear sclerosis, Trace Cortical cataract inferonasal   3+ Nuclear sclerosis    Vitreous Vitreous floaters Vitreous floaters              Fundus Exam         Right Left    Disc Good rim  Sloping margin    C/D Ratio 0.7 0.8    Macula Normal- no BDR Normal- no BDR    Vessels Normal Normal    Periphery Normal Normal                  Refraction       Wearing Rx         Sphere Cylinder Axis Add    Right -11.25 +3.00 180 +2.75    Left -9.25 +1.25 020 +2.75      Type: Progressive bifocal              Manifest Refraction         Sphere Cylinder [place] Dist [place] Add Near [place]    Right -12.50 +4.25 180 20/40 +3.00 20/30-    Left -9.75 +1.50 020 20/30+ +3.00 20/20-              Final Rx         Sphere Cylinder [place] Dist VA Add Near [place]    Right -12.50 +4.25 180 20/40 +3.00 20/30-    Left -9.75 +1.50 020 20/30+ +3.00 20/20-      Type: Progressive bifocal                     ASSESSMENT/PLAN:     Diagnoses and Plan:     Type 2 diabetes mellitus with hyperglycemia, without long-term current use of insulin ([facility])  Diabetes type II: no background of retinopathy, no signs of neovascularization noted. Discussed ocular and systemic benefits of blood sugar control. Diagnosis and treatment discussed in detail with patient. Primary open angle glaucoma (POAG) of left eye, mild stage  IOP is stable. Continue Latanoprost at bedtime in the left eye. Will have patient back for next available visual field and OCT with no MD then 4 months for a pressure check    Age-related nuclear cataract of both eyes   Discussed diagnosis of cataracts in detail with patient. Cataract surgery not indicated at this time due to vision level. Will continue to monitor yearly. Patient will call sooner than 1 year recall if they notice a change in vision. New glasses Rx given today    Floater, vitreous, bilateral   There is no evidence of retinal pathology.   All signs and symptoms of retinal detachment/tears explained in detail. Patient instructed to call the office if they experience increase in floaters, increase in flashes of light, loss of vision or curtain or veil effect. Orders Placed This Encounter      OCT, Optic Nerve - OU - Both Eyes      Fisher Visual Field - OU - Both Eyes      Meds This Visit:  Requested Prescriptions     Signed Prescriptions Disp Refills    latanoprost 0.005 % Ophthalmic Solution 3 each 3     Sig: INSTILL 1 DROP INTO LEFT EYE AT BEDTIME        Follow up instructions:  Return for Next Avail VF, OCT with no MD then 4 months for a pressure check.     9/9/2023  Scribed by: Anusha Handy MD

## 2023-09-09 NOTE — PATIENT INSTRUCTIONS
Type 2 diabetes mellitus with hyperglycemia, without long-term current use of insulin ([facility])  Diabetes type II: no background of retinopathy, no signs of neovascularization noted. Discussed ocular and systemic benefits of blood sugar control. Diagnosis and treatment discussed in detail with patient. Primary open angle glaucoma (POAG) of left eye, mild stage  IOP is stable. Continue Latanoprost at bedtime in the left eye. Will have patient back for next available visual field and OCT with no MD then 4 months for a pressure check    Age-related nuclear cataract of both eyes   Discussed diagnosis of cataracts in detail with patient. Cataract surgery not indicated at this time due to vision level. Will continue to monitor yearly. Patient will call sooner than 1 year recall if they notice a change in vision. New glasses Rx given today    Floater, vitreous, bilateral   There is no evidence of retinal pathology. All signs and symptoms of retinal detachment/tears explained in detail. Patient instructed to call the office if they experience increase in floaters, increase in flashes of light, loss of vision or curtain or veil effect.

## 2023-09-09 NOTE — ASSESSMENT & PLAN NOTE
Discussed diagnosis of cataracts in detail with patient. Cataract surgery not indicated at this time due to vision level. Will continue to monitor yearly. Patient will call sooner than 1 year recall if they notice a change in vision.   New glasses Rx given today

## 2023-09-23 ENCOUNTER — PATIENT MESSAGE (OUTPATIENT)
Dept: INTERNAL MEDICINE CLINIC | Facility: CLINIC | Age: 73
End: 2023-09-23

## 2023-09-25 NOTE — TELEPHONE ENCOUNTER
From: Sabrina Fink  To: Marquis Segal  Sent: 9/23/2023 12:57 PM CDT  Subject: Covid shot    I usually ask your opinion before getting shots, but I was at [facility] and they got got the boosters in so I got it. Tell me I did the right thing. Also do you have the flu shots yet. I'll see you in 2 weeks.

## 2023-09-30 ENCOUNTER — NURSE ONLY (OUTPATIENT)
Dept: OPHTHALMOLOGY | Facility: CLINIC | Age: 73
End: 2023-09-30

## 2023-09-30 ENCOUNTER — LAB ENCOUNTER (OUTPATIENT)
Dept: LAB | Age: 73
End: 2023-09-30
Attending: INTERNAL MEDICINE
Payer: MEDICARE

## 2023-09-30 DIAGNOSIS — Z12.5 PROSTATE CANCER SCREENING: ICD-10-CM

## 2023-09-30 DIAGNOSIS — E11.9 TYPE 2 DIABETES MELLITUS WITHOUT COMPLICATION, WITHOUT LONG-TERM CURRENT USE OF INSULIN (HCC): ICD-10-CM

## 2023-09-30 DIAGNOSIS — H40.1121 PRIMARY OPEN ANGLE GLAUCOMA (POAG) OF LEFT EYE, MILD STAGE: ICD-10-CM

## 2023-09-30 DIAGNOSIS — E78.5 DYSLIPIDEMIA: ICD-10-CM

## 2023-09-30 DIAGNOSIS — R53.83 FATIGUE, UNSPECIFIED TYPE: ICD-10-CM

## 2023-09-30 LAB
ALBUMIN SERPL-MCNC: 4 G/DL (ref 3.4–5)
ALBUMIN/GLOB SERPL: 1 {RATIO} (ref 1–2)
ALP LIVER SERPL-CCNC: 63 U/L
ALT SERPL-CCNC: 22 U/L
ANION GAP SERPL CALC-SCNC: 9 MMOL/L (ref 0–18)
AST SERPL-CCNC: 16 U/L (ref 15–37)
BASOPHILS # BLD AUTO: 0.1 X10(3) UL (ref 0–0.2)
BASOPHILS NFR BLD AUTO: 1.5 %
BILIRUB SERPL-MCNC: 0.7 MG/DL (ref 0.1–2)
BUN BLD-MCNC: 31 MG/DL (ref 7–18)
BUN/CREAT SERPL: 19.1 (ref 10–20)
CALCIUM BLD-MCNC: 9.3 MG/DL (ref 8.5–10.1)
CHLORIDE SERPL-SCNC: 106 MMOL/L (ref 98–112)
CHOLEST SERPL-MCNC: 108 MG/DL (ref ?–200)
CO2 SERPL-SCNC: 20 MMOL/L (ref 21–32)
COMPLEXED PSA SERPL-MCNC: 1.46 NG/ML (ref ?–4)
CREAT BLD-MCNC: 1.62 MG/DL
CREAT UR-SCNC: 87.7 MG/DL
DEPRECATED RDW RBC AUTO: 49 FL (ref 35.1–46.3)
EGFRCR SERPLBLD CKD-EPI 2021: 45 ML/MIN/1.73M2 (ref 60–?)
EOSINOPHIL # BLD AUTO: 0.41 X10(3) UL (ref 0–0.7)
EOSINOPHIL NFR BLD AUTO: 6.1 %
ERYTHROCYTE [DISTWIDTH] IN BLOOD BY AUTOMATED COUNT: 13.5 % (ref 11–15)
EST. AVERAGE GLUCOSE BLD GHB EST-MCNC: 148 MG/DL (ref 68–126)
FASTING PATIENT LIPID ANSWER: YES
FASTING STATUS PATIENT QL REPORTED: YES
GLOBULIN PLAS-MCNC: 4 G/DL (ref 2.8–4.4)
GLUCOSE BLD-MCNC: 128 MG/DL (ref 70–99)
HBA1C MFR BLD: 6.8 % (ref ?–5.7)
HCT VFR BLD AUTO: 44.3 %
HDLC SERPL-MCNC: 35 MG/DL (ref 40–59)
HGB BLD-MCNC: 14.8 G/DL
IMM GRANULOCYTES # BLD AUTO: 0.03 X10(3) UL (ref 0–1)
IMM GRANULOCYTES NFR BLD: 0.4 %
LDLC SERPL CALC-MCNC: 54 MG/DL (ref ?–100)
LYMPHOCYTES # BLD AUTO: 2.35 X10(3) UL (ref 1–4)
LYMPHOCYTES NFR BLD AUTO: 34.8 %
MCH RBC QN AUTO: 32.7 PG (ref 26–34)
MCHC RBC AUTO-ENTMCNC: 33.4 G/DL (ref 31–37)
MCV RBC AUTO: 97.8 FL
MICROALBUMIN UR-MCNC: 2.39 MG/DL
MICROALBUMIN/CREAT 24H UR-RTO: 27.3 UG/MG (ref ?–30)
MONOCYTES # BLD AUTO: 0.66 X10(3) UL (ref 0.1–1)
MONOCYTES NFR BLD AUTO: 9.8 %
NEUTROPHILS # BLD AUTO: 3.2 X10 (3) UL (ref 1.5–7.7)
NEUTROPHILS # BLD AUTO: 3.2 X10(3) UL (ref 1.5–7.7)
NEUTROPHILS NFR BLD AUTO: 47.4 %
NONHDLC SERPL-MCNC: 73 MG/DL (ref ?–130)
OSMOLALITY SERPL CALC.SUM OF ELEC: 288 MOSM/KG (ref 275–295)
PLATELET # BLD AUTO: 280 10(3)UL (ref 150–450)
POTASSIUM SERPL-SCNC: 5.1 MMOL/L (ref 3.5–5.1)
PROT SERPL-MCNC: 8 G/DL (ref 6.4–8.2)
RBC # BLD AUTO: 4.53 X10(6)UL
SODIUM SERPL-SCNC: 135 MMOL/L (ref 136–145)
TRIGL SERPL-MCNC: 103 MG/DL (ref 30–149)
VLDLC SERPL CALC-MCNC: 15 MG/DL (ref 0–30)
WBC # BLD AUTO: 6.8 X10(3) UL (ref 4–11)

## 2023-09-30 PROCEDURE — 80053 COMPREHEN METABOLIC PANEL: CPT

## 2023-09-30 PROCEDURE — 83036 HEMOGLOBIN GLYCOSYLATED A1C: CPT

## 2023-09-30 PROCEDURE — 80061 LIPID PANEL: CPT

## 2023-09-30 PROCEDURE — 82570 ASSAY OF URINE CREATININE: CPT

## 2023-09-30 PROCEDURE — 92133 CPTRZD OPH DX IMG PST SGM ON: CPT | Performed by: OPHTHALMOLOGY

## 2023-09-30 PROCEDURE — 82043 UR ALBUMIN QUANTITATIVE: CPT

## 2023-09-30 PROCEDURE — 36415 COLL VENOUS BLD VENIPUNCTURE: CPT

## 2023-09-30 PROCEDURE — 92083 EXTENDED VISUAL FIELD XM: CPT | Performed by: OPHTHALMOLOGY

## 2023-09-30 PROCEDURE — 85025 COMPLETE CBC W/AUTO DIFF WBC: CPT

## 2023-10-04 NOTE — PROGRESS NOTES
Musa Marina is a 68year old male. HPI:     HPI    Patient is here for a glaucoma work up with HVF and OCT, rodolfo TODD Last edited by Veronica Agustin on 9/30/2023  9:50 AM.        Patient History:  Past Medical History:   Diagnosis Date    Diabetes ([address]     Dyslipidemia     Essential hypertension     Primary open-angle glaucoma, left eye, mild stage 8/23/2017 8//23/17 Started on Latanoprost qhs OS by Dr. Aurelia Wall due to thinning of the optic nerve OS on OCT     Psoriasis     Wears glasses        Surgical History: Musa Marina has a past surgical history that includes colonoscopy (11/2018) and colonoscopy (N/A, 11/14/2018) (Procedure: COLONOSCOPY, POSSIBLE BIOPSY, POSSIBLE POLYPECTOMY 50260;  Surgeon: Dorota Pineda MD;  Location: [address] [place] [address]). Family History   Problem Relation Age of Onset    Cancer Father         unknown    Heart Disorder Mother 80        heart attack    Stroke Sister 46    Diabetes Neg     Glaucoma Neg     Macular degeneration Neg        Social History:   Social History     Socioeconomic History    Marital status:    Tobacco Use    Smoking status: Never    Smokeless tobacco: Never   Vaping Use    Vaping Use: Never used   Substance and Sexual Activity    Alcohol use: No    Drug use: No       Medications:  Current Outpatient Medications   Medication Sig Dispense Refill    latanoprost 0.005 % Ophthalmic Solution INSTILL 1 DROP INTO LEFT EYE AT BEDTIME 3 each 3    lisinopril 20 MG Oral Tab Take 1 tablet (20 mg total) by mouth daily. 90 tablet 3    metFORMIN HCl 1000 MG Oral Tab Take 1 tablet (1,000 mg total) by mouth 2 (two) times daily with meals. 180 tablet 3    simvastatin (ZOCOR) 20 MG Oral Tab Take 1 tablet (20 mg total) by mouth nightly. 90 tablet 3    aspirin 81 MG Oral Tab EC Take 1 tablet (81 mg total) by mouth daily. 90 tablet 3    fluocinonide 0.05 % External Ointment Apply 1 g topically 2 (two) times daily.  60 g 3    VENTOLIN  (90 Base) MCG/ACT Inhalation Aero Soln Inhale 2 puffs into the lungs 3 (three) times daily as needed for Wheezing. 1 each 5    Microlet Lancets ([employer] MICROLET LANCETS) Does not apply Misc Test BS daily DX E 11.9 Non insulin dependent 100 each 11    Glucose Blood (CONTOUR NEXT TEST) In Vitro Strip 1 each by Other route daily. 100 each 11       Allergies:    Penicillins             HIVES    ROS:       PHYSICAL EXAM:   Not recorded          ASSESSMENT/PLAN:     Diagnoses and Plan:     Primary open angle glaucoma (POAG) of left eye, mild stage  Normal visual field, OCT, right eye. Abnormal visual field, OCT, left eye. Continue Latanoprost, 1 drop, left eye, at bedtime. No orders of the defined types were placed in this encounter. Meds This Visit:  Requested Prescriptions      No prescriptions requested or ordered in this encounter        Follow up instructions:  Return 1/13/2024 at 7:45 AM for IOP check.     10/3/2023  Scribed by: Sela Duverney, MD

## 2023-10-04 NOTE — ASSESSMENT & PLAN NOTE
----- Message from Rachel Zarco PA-C sent at 8/21/2018  2:55 PM EDT -----   Please write a letter for the patient stating:    "Lam Goodson is under my professional care  She was seen in my office on 8/17/2018  She has surgery scheduled on 9-18-18 for her left long finger  "     Please call the patient when it is done  Thank you       ----- Message -----  From: Mohamud Ibarra MA  Sent: 8/21/2018   1:16 PM  To: Rachel Zarco PA-C    Patient now scheduled for 9/18 and needs work note made for that date   original was 8/29 Normal visual field, OCT, right eye. Abnormal visual field, OCT, left eye. Continue Latanoprost, 1 drop, left eye, at bedtime.

## 2023-10-06 ENCOUNTER — OFFICE VISIT (OUTPATIENT)
Dept: INTERNAL MEDICINE CLINIC | Facility: CLINIC | Age: 73
End: 2023-10-06

## 2023-10-06 VITALS
HEIGHT: 67 IN | SYSTOLIC BLOOD PRESSURE: 118 MMHG | DIASTOLIC BLOOD PRESSURE: 70 MMHG | HEART RATE: 74 BPM | TEMPERATURE: 98 F | WEIGHT: 209 LBS | OXYGEN SATURATION: 97 % | BODY MASS INDEX: 32.8 KG/M2

## 2023-10-06 DIAGNOSIS — E78.5 DYSLIPIDEMIA: ICD-10-CM

## 2023-10-06 DIAGNOSIS — N17.9 ACUTE KIDNEY INJURY (HCC): ICD-10-CM

## 2023-10-06 DIAGNOSIS — Z12.5 PROSTATE CANCER SCREENING: ICD-10-CM

## 2023-10-06 DIAGNOSIS — E11.65 TYPE 2 DIABETES MELLITUS WITH HYPERGLYCEMIA, UNSPECIFIED WHETHER LONG TERM INSULIN USE (HCC): ICD-10-CM

## 2023-10-06 DIAGNOSIS — I10 ESSENTIAL HYPERTENSION: ICD-10-CM

## 2023-10-06 DIAGNOSIS — E11.9 TYPE 2 DIABETES MELLITUS WITHOUT COMPLICATION, WITHOUT LONG-TERM CURRENT USE OF INSULIN (HCC): ICD-10-CM

## 2023-10-06 DIAGNOSIS — R53.83 FATIGUE, UNSPECIFIED TYPE: ICD-10-CM

## 2023-10-06 DIAGNOSIS — Z00.00 ENCOUNTER FOR ANNUAL HEALTH EXAMINATION: Primary | ICD-10-CM

## 2023-10-06 PROCEDURE — G0008 ADMIN INFLUENZA VIRUS VAC: HCPCS | Performed by: INTERNAL MEDICINE

## 2023-10-06 PROCEDURE — G0439 PPPS, SUBSEQ VISIT: HCPCS | Performed by: INTERNAL MEDICINE

## 2023-10-06 PROCEDURE — 90662 IIV NO PRSV INCREASED AG IM: CPT | Performed by: INTERNAL MEDICINE

## 2023-10-06 PROCEDURE — 1126F AMNT PAIN NOTED NONE PRSNT: CPT | Performed by: INTERNAL MEDICINE

## 2023-10-06 RX ORDER — SIMVASTATIN 20 MG
20 TABLET ORAL NIGHTLY
Qty: 90 TABLET | Refills: 3 | Status: SHIPPED | OUTPATIENT
Start: 2023-10-06

## 2023-10-06 RX ORDER — SIMVASTATIN 20 MG
20 TABLET ORAL NIGHTLY
Qty: 90 TABLET | Refills: 3 | Status: CANCELLED | OUTPATIENT
Start: 2023-10-06

## 2023-10-28 ENCOUNTER — LAB ENCOUNTER (OUTPATIENT)
Dept: LAB | Age: 73
End: 2023-10-28
Attending: INTERNAL MEDICINE
Payer: MEDICARE

## 2023-10-28 DIAGNOSIS — N17.9 ACUTE KIDNEY INJURY (HCC): ICD-10-CM

## 2023-10-28 LAB
ANION GAP SERPL CALC-SCNC: 9 MMOL/L (ref 0–18)
BUN BLD-MCNC: 24 MG/DL (ref 7–18)
BUN/CREAT SERPL: 14.8 (ref 10–20)
CALCIUM BLD-MCNC: 8.8 MG/DL (ref 8.5–10.1)
CHLORIDE SERPL-SCNC: 105 MMOL/L (ref 98–112)
CO2 SERPL-SCNC: 23 MMOL/L (ref 21–32)
CREAT BLD-MCNC: 1.62 MG/DL
EGFRCR SERPLBLD CKD-EPI 2021: 45 ML/MIN/1.73M2 (ref 60–?)
FASTING STATUS PATIENT QL REPORTED: NO
GLUCOSE BLD-MCNC: 173 MG/DL (ref 70–99)
OSMOLALITY SERPL CALC.SUM OF ELEC: 292 MOSM/KG (ref 275–295)
POTASSIUM SERPL-SCNC: 4.4 MMOL/L (ref 3.5–5.1)
SODIUM SERPL-SCNC: 137 MMOL/L (ref 136–145)

## 2023-10-28 PROCEDURE — 36415 COLL VENOUS BLD VENIPUNCTURE: CPT

## 2023-10-28 PROCEDURE — 80048 BASIC METABOLIC PNL TOTAL CA: CPT

## 2023-10-30 ENCOUNTER — TELEPHONE (OUTPATIENT)
Dept: INTERNAL MEDICINE CLINIC | Facility: CLINIC | Age: 73
End: 2023-10-30

## 2023-10-30 DIAGNOSIS — N17.9 AKI (ACUTE KIDNEY INJURY) (HCC): Primary | ICD-10-CM

## 2023-10-30 NOTE — TELEPHONE ENCOUNTER
Patient is returning nurse call   Please call around 10 if possible he is at work, he is aware nursing may not be able to call at this time

## 2023-10-30 NOTE — TELEPHONE ENCOUNTER
Patient called and relayed Dr Imogene Phalen message. Patient verbalized understanding with no further questions noted.

## 2023-10-30 NOTE — TELEPHONE ENCOUNTER
Please notify pt that his kidney number are still a bit high; I would like him to do an ultrasound of the kidneys/bladder to evaluate this further

## 2023-11-14 ENCOUNTER — PATIENT MESSAGE (OUTPATIENT)
Dept: INTERNAL MEDICINE CLINIC | Facility: CLINIC | Age: 73
End: 2023-11-14

## 2023-11-14 ENCOUNTER — TELEPHONE (OUTPATIENT)
Dept: INTERNAL MEDICINE CLINIC | Facility: CLINIC | Age: 73
End: 2023-11-14

## 2023-11-14 NOTE — TELEPHONE ENCOUNTER
URI Triage:     Fever: Yes[]               No[x]                 Unknown[]  [] Temperature:   [] Chills   [] Night sweats  [] Body aches     Cough: Yes[x]              No[]  [] Productive cough  [] Cough with exertion  [] Dry cough     Respiratory Symptoms: Yes[]          No[x]  [] Wheezing  [] Pain with deep breathing  [] SOB with exertion  [] SOB at rest  [] Heavy breathing  [] Chest discomfort with deep breathing or coughing     GI Symptoms: Yes []            No[x]  [] Diarrhea  [] Nausea  [] Vomiting  [] Abdominal pain  [] Lack of appetite     Other symptoms:  [x] Sore throat  [] Difficulty swallowing  [] Sinus pressure  [x] Nasal drainage  [x] Nasal congestion  [] Chest congestion  [] Head congestion  [x] PND  [] Facial pain   [] Ear pain  [] Conjunctivitis  [] Headache  [] Fatigue  [] Weakness  [] Loss of sense of smell   [] Loss of sense of taste     [x]OTC Medications:  Cepacol  Advil  Robitussin   Flonase        Symptom onset:  3 days ago       To on call (Dr. Fall Marking):  Please advise in absence of Dr. Brandon Orona. Pt's primary complaint is sore throat, and PND causing cough. Tested negative for covid yesterday. No relief with Cepacol lozenges, Advil, robitussin, or Flonase nasal spray. Please advise.

## 2023-11-14 NOTE — TELEPHONE ENCOUNTER
Would recommend warm salt water gargles, Tylenol as needed. Can also take over-the-counter Tylenol sinus to help with sinus and nasal congestion. If no significant improvement over the next 1 to 2 days, needs office evaluation.

## 2023-11-14 NOTE — TELEPHONE ENCOUNTER
Spoke with patient. Relayed MD message. Pt verbalized understanding. Pt requesting office visit. Scheduled for tomorrow with Dr. Russell Khan. Instructed pt to wear mask while in clinic -- pt verbalized understanding.

## 2023-11-14 NOTE — TELEPHONE ENCOUNTER
From: Jose Nguyen  To: Ashlyn Taylor  Sent: 11/14/2023  8:24 AM CST  Subject: sore throat    Have had a sore throat for the lasr 3 days and Cepacol and Advil aren't working. Any suggestions. I can feel it in my throat and its making me cough. Using sugar free robitussin now.

## 2023-11-14 NOTE — TELEPHONE ENCOUNTER
From: Josefina Ramirez  To: Tayla Mathur  Sent: 11/14/2023 8:24 AM CST  Subject: sore throat    Have had a sore throat for the lasr 3 days and Cepacol and Advil aren't working. Any suggestions. I can feel it in my throat and its making me cough. Using sugar free robitussin now.

## 2023-11-15 ENCOUNTER — OFFICE VISIT (OUTPATIENT)
Dept: INTERNAL MEDICINE CLINIC | Facility: CLINIC | Age: 73
End: 2023-11-15
Payer: MEDICARE

## 2023-11-15 VITALS
DIASTOLIC BLOOD PRESSURE: 64 MMHG | BODY MASS INDEX: 32.65 KG/M2 | WEIGHT: 208 LBS | TEMPERATURE: 98 F | HEART RATE: 90 BPM | OXYGEN SATURATION: 97 % | SYSTOLIC BLOOD PRESSURE: 126 MMHG | HEIGHT: 67 IN

## 2023-11-15 DIAGNOSIS — R05.1 ACUTE COUGH: ICD-10-CM

## 2023-11-15 DIAGNOSIS — J02.9 SORE THROAT: Primary | ICD-10-CM

## 2023-11-15 PROCEDURE — 99214 OFFICE O/P EST MOD 30 MIN: CPT | Performed by: INTERNAL MEDICINE

## 2023-11-15 PROCEDURE — 1126F AMNT PAIN NOTED NONE PRSNT: CPT | Performed by: INTERNAL MEDICINE

## 2023-11-18 ENCOUNTER — HOSPITAL ENCOUNTER (OUTPATIENT)
Dept: ULTRASOUND IMAGING | Age: 73
Discharge: HOME | End: 2023-11-18
Attending: INTERNAL MEDICINE
Payer: MEDICARE

## 2023-11-18 DIAGNOSIS — N17.9 AKI (ACUTE KIDNEY INJURY) (HCC): ICD-10-CM

## 2023-11-18 PROCEDURE — 76770 US EXAM ABDO BACK WALL COMP: CPT | Performed by: INTERNAL MEDICINE

## 2023-11-21 ENCOUNTER — TELEPHONE (OUTPATIENT)
Dept: INTERNAL MEDICINE CLINIC | Facility: CLINIC | Age: 73
End: 2023-11-21

## 2023-11-21 NOTE — TELEPHONE ENCOUNTER
Called patient and relayed DR. LOAIZA message -verbalized understanding .  Info for nephrologist given by Evaristo Entertainshalonda

## 2023-11-21 NOTE — TELEPHONE ENCOUNTER
Please notify pt that his kidney ultrasound was normal--I am still concerned about his kidney function changing and would ask him to see a kidney specialist--would recommend Dr. Kehinde Vuong or Dr. Karuna Ludwig. Please provide him contact information.

## 2023-11-21 NOTE — TELEPHONE ENCOUNTER
Patient called back. He gave another number to call.  He will  be at this # for the next couple of hours    [phone]

## 2023-12-29 RX ORDER — ALBUTEROL SULFATE 90 UG/1
2 AEROSOL, METERED RESPIRATORY (INHALATION) 3 TIMES DAILY PRN
Qty: 1 EACH | Refills: 11 | Status: SHIPPED | OUTPATIENT
Start: 2023-12-29

## 2023-12-29 NOTE — TELEPHONE ENCOUNTER
Refill request is for a maintenance medication and has met the criteria specified in the Ambulatory Medication Refill Standing Order for eligibility, visits, laboratory, alerts and was sent to the requested pharmacy. Requested Prescriptions     Signed Prescriptions Disp Refills    VENTOLIN  (90 Base) MCG/ACT Inhalation Aero Soln 1 each 11     Sig: Inhale 2 puffs into the lungs 3 (three) times daily as needed for Wheezing.      Authorizing Provider: Aury Stack     Ordering User: Sam Villa

## 2024-01-02 RX ORDER — LATANOPROST 50 UG/ML
SOLUTION/ DROPS OPHTHALMIC
Qty: 3 EACH | Refills: 3 | Status: SHIPPED | OUTPATIENT
Start: 2024-01-02

## 2024-01-05 ENCOUNTER — TELEPHONE (OUTPATIENT)
Dept: INTERNAL MEDICINE CLINIC | Facility: CLINIC | Age: 74
End: 2024-01-05

## 2024-01-05 ENCOUNTER — OFFICE VISIT (OUTPATIENT)
Dept: FAMILY MEDICINE CLINIC | Facility: CLINIC | Age: 74
End: 2024-01-05
Payer: MEDICARE

## 2024-01-05 VITALS
TEMPERATURE: 100 F | SYSTOLIC BLOOD PRESSURE: 112 MMHG | OXYGEN SATURATION: 98 % | DIASTOLIC BLOOD PRESSURE: 68 MMHG | RESPIRATION RATE: 18 BRPM | HEART RATE: 105 BPM

## 2024-01-05 DIAGNOSIS — J22 LOWER RESPIRATORY INFECTION: Primary | ICD-10-CM

## 2024-01-05 DIAGNOSIS — R09.82 PND (POST-NASAL DRIP): ICD-10-CM

## 2024-01-05 LAB
OPERATOR ID: NORMAL
RAPID SARS-COV-2 BY PCR: NOT DETECTED

## 2024-01-05 PROCEDURE — 99213 OFFICE O/P EST LOW 20 MIN: CPT | Performed by: NURSE PRACTITIONER

## 2024-01-05 PROCEDURE — U0002 COVID-19 LAB TEST NON-CDC: HCPCS | Performed by: NURSE PRACTITIONER

## 2024-01-05 RX ORDER — DOXYCYCLINE HYCLATE 100 MG
100 TABLET ORAL 2 TIMES DAILY
Qty: 14 TABLET | Refills: 0 | Status: SHIPPED | OUTPATIENT
Start: 2024-01-05 | End: 2024-01-12

## 2024-01-05 RX ORDER — BENZONATATE 200 MG/1
200 CAPSULE ORAL 3 TIMES DAILY PRN
Qty: 21 CAPSULE | Refills: 0 | Status: SHIPPED | OUTPATIENT
Start: 2024-01-05

## 2024-01-05 NOTE — TELEPHONE ENCOUNTER
Patient is calling with a cough. Patient states he has had the cough for three weeks. Would like to see Dr Rider on Monday, 1/8/2024.     Dr Rider only has one opening on Monday, okay to use the appointment?  Patient was offered an appointment on Wednesday, 1/10/2024 with Dr Rider but patient would like to be seen on Monday. Patient also aware Dr Rider is out of the office until Monday, okay to wait for her response per patient.

## 2024-01-05 NOTE — TELEPHONE ENCOUNTER
Left message to call back.   Can he take covid test before aguila. ? To  - patient wants to be seen monday

## 2024-01-06 NOTE — PROGRESS NOTES
Subjective:   Patient ID: Isra Reese is a 73 year old male.    Patient is a 73 year old male who presents today with complaints of pnd, cough (productive), runny nose, nasal congestion, occasionally wheezing x 3 weeks. Denies sore throat, ear pain, hemoptysis, SOB, n/v/d, abdominal pain, fevers, body aches, chills, headaches and sinus pressure. Tolerating PO well at home. Attempted treatment prior to arrival = safe tussin, albuterol (LD yesterday) with relief.         History/Other:   Review of Systems   Constitutional:  Negative for appetite change, chills and fever.   HENT:  Positive for congestion, postnasal drip and rhinorrhea. Negative for ear pain, sinus pressure and sore throat.    Respiratory:  Positive for cough and wheezing. Negative for shortness of breath.    Gastrointestinal:  Negative for abdominal pain, diarrhea, nausea and vomiting.   Musculoskeletal:  Negative for myalgias.   Neurological:  Negative for headaches.     Current Outpatient Medications   Medication Sig Dispense Refill    Doxycycline Hyclate 100 MG Oral Tab Take 1 tablet (100 mg total) by mouth 2 (two) times daily for 7 days. 14 tablet 0    benzonatate 200 MG Oral Cap Take 1 capsule (200 mg total) by mouth 3 (three) times daily as needed for cough. 21 capsule 0    latanoprost 0.005 % Ophthalmic Solution INSTILL 1 DROP INTO LEFT EYE AT BEDTIME 3 each 3    VENTOLIN  (90 Base) MCG/ACT Inhalation Aero Soln Inhale 2 puffs into the lungs 3 (three) times daily as needed for Wheezing. 1 each 11    metFORMIN HCl 1000 MG Oral Tab Take 1 tablet (1,000 mg total) by mouth 2 (two) times daily with meals. 180 tablet 3    simvastatin (ZOCOR) 20 MG Oral Tab Take 1 tablet (20 mg total) by mouth nightly. 90 tablet 3    lisinopril 20 MG Oral Tab Take 1 tablet (20 mg total) by mouth daily. 90 tablet 3    aspirin 81 MG Oral Tab EC Take 1 tablet (81 mg total) by mouth daily. 90 tablet 3    fluocinonide 0.05 % External Ointment Apply 1 g topically 2  (two) times daily. 60 g 3    Microlet Lancets (BRANT MICROLET LANCETS) Does not apply Misc Test BS daily DX E 11.9 Non insulin dependent 100 each 11    Glucose Blood (CONTOUR NEXT TEST) In Vitro Strip 1 each by Other route daily. 100 each 11     Allergies:  Allergies   Allergen Reactions    Penicillins HIVES     /68 (BP Location: Right arm, Patient Position: Sitting)   Pulse 105   Temp 99.8 °F (37.7 °C) (Tympanic)   Resp 18   SpO2 98%     Objective:   Physical Exam  Vitals reviewed.   Constitutional:       General: He is awake. He is not in acute distress.     Appearance: Normal appearance. He is well-developed and well-groomed. He is not ill-appearing, toxic-appearing or diaphoretic.   HENT:      Head: Normocephalic and atraumatic.      Right Ear: Tympanic membrane, ear canal and external ear normal.      Left Ear: Tympanic membrane, ear canal and external ear normal.      Nose: Rhinorrhea present.      Mouth/Throat:      Lips: Pink.      Mouth: Mucous membranes are moist. No oral lesions.      Pharynx: Oropharynx is clear. Uvula midline.   Cardiovascular:      Rate and Rhythm: Normal rate and regular rhythm.   Pulmonary:      Effort: Pulmonary effort is normal. No respiratory distress.      Breath sounds: Wheezing and rhonchi present.      Comments: Wheezing throughout all lung fields. + rhonchi on the right side. Minimally clears with coughing.  Lymphadenopathy:      Cervical: No cervical adenopathy.   Skin:     General: Skin is warm and dry.   Neurological:      Mental Status: He is alert and oriented to person, place, and time.   Psychiatric:         Behavior: Behavior is cooperative.         Assessment & Plan:   1. Lower respiratory infection    2. PND (post-nasal drip)        Orders Placed This Encounter   Procedures    COVID-19 LAB TEST NON-CDC       Meds This Visit:  Requested Prescriptions     Signed Prescriptions Disp Refills    Doxycycline Hyclate 100 MG Oral Tab 14 tablet 0     Sig: Take 1  tablet (100 mg total) by mouth 2 (two) times daily for 7 days.    benzonatate 200 MG Oral Cap 21 capsule 0     Sig: Take 1 capsule (200 mg total) by mouth 3 (three) times daily as needed for cough.     Reviewed POC test results with patient.  Reassuring physical exam findings. Vitals WNL. No sign of RDS or dehydration at this time.  Due to HPI, duration of symptoms and clinical exam findings, will cover for possible PNA (wheezing, rhonchi that does not clear with coughing) with Doxycycline. Has Albuterol at home with he's using with relief of wheezing. Tessalon PRN for cough as prescribed.  Supportive care and return to care measures reviewed.  Patient v/u and is comfortable with this plan.    Patient Instructions   Tylenol and Motrin as needed  Rest, push fluids  Tessalon as needed as prescribed for cough  Doxycycline (antibiotic) as prescribed, finish all 7 days  START daily antihistamine (Zyrtec, Claritin, Allegra) and choose one of those. Take daily for 1-2 weeks to help with any post nasal drainage/sore throat  Return to care for new/worsening symptoms

## 2024-01-06 NOTE — PATIENT INSTRUCTIONS
Tylenol and Motrin as needed  Rest, push fluids  Tessalon as needed as prescribed for cough  Doxycycline (antibiotic) as prescribed, finish all 7 days  START daily antihistamine (Zyrtec, Claritin, Allegra) and choose one of those. Take daily for 1-2 weeks to help with any post nasal drainage/sore throat  Return to care for new/worsening symptoms

## 2024-01-08 ENCOUNTER — TELEPHONE (OUTPATIENT)
Dept: OPHTHALMOLOGY | Facility: CLINIC | Age: 74
End: 2024-01-08

## 2024-01-08 NOTE — TELEPHONE ENCOUNTER
Patient was called. No answer, a message was left to call back.     Can patient come in today at 1030?

## 2024-01-08 NOTE — TELEPHONE ENCOUNTER
Pt returned call  States he saw NP on Friday/at [facility]   Covid test was negative & an antibiotic was prescribed  Pt is feeling much better, wheezing is almost gone

## 2024-01-18 NOTE — TELEPHONE ENCOUNTER
Called patient to reschedule appt to 01/27/24 since Dr. Ch opened this day to see patients. LM for patient to call us back.

## 2024-01-22 ENCOUNTER — OFFICE VISIT (OUTPATIENT)
Dept: INTERNAL MEDICINE CLINIC | Facility: CLINIC | Age: 74
End: 2024-01-22

## 2024-01-22 VITALS
DIASTOLIC BLOOD PRESSURE: 80 MMHG | OXYGEN SATURATION: 100 % | HEART RATE: 104 BPM | WEIGHT: 191 LBS | SYSTOLIC BLOOD PRESSURE: 126 MMHG | HEIGHT: 67 IN | TEMPERATURE: 98 F | BODY MASS INDEX: 29.98 KG/M2

## 2024-01-22 DIAGNOSIS — R19.7 DIARRHEA OF PRESUMED INFECTIOUS ORIGIN: Primary | ICD-10-CM

## 2024-01-22 DIAGNOSIS — R05.1 ACUTE COUGH: ICD-10-CM

## 2024-01-22 DIAGNOSIS — K29.00 ACUTE GASTRITIS, PRESENCE OF BLEEDING UNSPECIFIED, UNSPECIFIED GASTRITIS TYPE: ICD-10-CM

## 2024-01-22 PROCEDURE — 1126F AMNT PAIN NOTED NONE PRSNT: CPT | Performed by: INTERNAL MEDICINE

## 2024-01-22 PROCEDURE — 99214 OFFICE O/P EST MOD 30 MIN: CPT | Performed by: INTERNAL MEDICINE

## 2024-01-22 RX ORDER — OMEPRAZOLE 40 MG/1
40 CAPSULE, DELAYED RELEASE ORAL DAILY
Qty: 90 CAPSULE | Refills: 3 | Status: SHIPPED | OUTPATIENT
Start: 2024-01-22 | End: 2025-01-16

## 2024-01-22 NOTE — PROGRESS NOTES
Isra Reese is a 73 year old male.  Chief Complaint   Patient presents with    Cough     Cough - a few weeks ago, went away then came back yesterday. Dry. Also acid reflux       HPI:   Isra Reese is a 73 year old male who presents for:    Cough-Went to -was given doxycycline and tessalon and everything got better. Since yesterday, started coughing again. No fever, no wheezing.   Having heartburn--burp, having burning sensation. Has been eating later recently.     Has had diarrhea for about 1 week            Wt Readings from Last 6 Encounters:   01/22/24 191 lb (86.6 kg)   11/15/23 208 lb (94.3 kg)   10/06/23 209 lb (94.8 kg)   03/31/23 206 lb (93.4 kg)   09/21/22 207 lb 3.2 oz (94 kg)   07/21/22 208 lb (94.3 kg)     Body mass index is 29.91 kg/m².       Current Outpatient Medications   Medication Sig Dispense Refill    latanoprost 0.005 % Ophthalmic Solution INSTILL 1 DROP INTO LEFT EYE AT BEDTIME 3 each 3    VENTOLIN  (90 Base) MCG/ACT Inhalation Aero Soln Inhale 2 puffs into the lungs 3 (three) times daily as needed for Wheezing. 1 each 11    metFORMIN HCl 1000 MG Oral Tab Take 1 tablet (1,000 mg total) by mouth 2 (two) times daily with meals. 180 tablet 3    simvastatin (ZOCOR) 20 MG Oral Tab Take 1 tablet (20 mg total) by mouth nightly. 90 tablet 3    lisinopril 20 MG Oral Tab Take 1 tablet (20 mg total) by mouth daily. 90 tablet 3    aspirin 81 MG Oral Tab EC Take 1 tablet (81 mg total) by mouth daily. 90 tablet 3    fluocinonide 0.05 % External Ointment Apply 1 g topically 2 (two) times daily. 60 g 3    Microlet Lancets (BRANT MICROLET LANCETS) Does not apply Misc Test BS daily DX E 11.9 Non insulin dependent 100 each 11    Glucose Blood (CONTOUR NEXT TEST) In Vitro Strip 1 each by Other route daily. 100 each 11    benzonatate 200 MG Oral Cap Take 1 capsule (200 mg total) by mouth 3 (three) times daily as needed for cough. (Patient not taking: Reported on 1/22/2024) 21 capsule 0      Past  Medical History:   Diagnosis Date    Diabetes (HCC)     Dyslipidemia     Essential hypertension     Primary open-angle glaucoma, left eye, mild stage 8/23/2017 8//23/17 Started on Latanoprost qhs OS by Dr. Ch due to thinning of the optic nerve OS on OCT     Psoriasis     Wears glasses       Past Surgical History:   Procedure Laterality Date    COLONOSCOPY  11/2018    COLONOSCOPY N/A 11/14/2018    Procedure: COLONOSCOPY, POSSIBLE BIOPSY, POSSIBLE POLYPECTOMY 47328;  Surgeon: Stephan Perez MD;  [place] [facility]      Family History   Problem Relation Age of Onset    Cancer Father         unknown    Heart Disorder Mother 88        heart attack    Stroke Sister 51    Diabetes Neg     Glaucoma Neg     Macular degeneration Neg       Social History:   Social History     Socioeconomic History    Marital status:    Tobacco Use    Smoking status: Never    Smokeless tobacco: Never   Vaping Use    Vaping Use: Never used   Substance and Sexual Activity    Alcohol use: No    Drug use: No          REVIEW OF SYSTEMS:   GENERAL: feels well otherwise  HEENT: denies nasal congestion, sinus pain, ST, sore throat, ear pain  LUNGS: denies shortness of breath with exertion, wheezing,or sputum production. +dry cough      EXAM:   /80   Pulse 104   Temp 97.8 °F (36.6 °C)   Ht 5' 7\" (1.702 m)   Wt 191 lb (86.6 kg)   SpO2 100%   BMI 29.91 kg/m²     GENERAL: well developed, well nourished, in no apparent distress  HEENT: normal oropharynx without erythema or exudate, normal TM's, no sinus tenderness, nares patent  LUNGS: clear to auscultation bilaterally, no wheezing or rhonchi  CARDIO: RRR, normal S1S2, no gallops or murmurs  ABD: soft, NT, ND, NABS; no ttp, no guarding or rebound      ASSESSMENT AND PLAN:     Cough  -rec cough suppression; benzonatate, cepacol, warm tea/honey, sleep propped up    2. Gastritis  Omeprazole daily  Given heartburn handout as well  Update me in 2 weeks    3.  Diarrhea  Check stool cx, cdiff  Brat diet for now    Elyse Rider DO  1/22/2024  10:38 AM

## 2024-02-05 ENCOUNTER — PATIENT MESSAGE (OUTPATIENT)
Dept: INTERNAL MEDICINE CLINIC | Facility: CLINIC | Age: 74
End: 2024-02-05

## 2024-02-05 DIAGNOSIS — J22 LOWER RESPIRATORY INFECTION: ICD-10-CM

## 2024-02-05 NOTE — TELEPHONE ENCOUNTER
From: Isra Reese  To: Elyse Rider  Sent: 2/5/2024 7:54 AM CST  Subject: Benzonatate refill    Like to get a refill on this but it says that it is not available thru my chart. Can you help?  thanks

## 2024-02-06 RX ORDER — BENZONATATE 200 MG/1
200 CAPSULE ORAL 3 TIMES DAILY PRN
Qty: 30 CAPSULE | Refills: 0 | Status: SHIPPED | OUTPATIENT
Start: 2024-02-06

## 2024-02-10 ENCOUNTER — OFFICE VISIT (OUTPATIENT)
Dept: OPHTHALMOLOGY | Facility: CLINIC | Age: 74
End: 2024-02-10
Payer: MEDICARE

## 2024-02-10 DIAGNOSIS — H40.1121 PRIMARY OPEN ANGLE GLAUCOMA (POAG) OF LEFT EYE, MILD STAGE: Primary | ICD-10-CM

## 2024-02-10 PROCEDURE — 99213 OFFICE O/P EST LOW 20 MIN: CPT | Performed by: OPHTHALMOLOGY

## 2024-02-10 NOTE — PROGRESS NOTES
Isra Reese is a 73 year old male.    HPI:     HPI    Pt is here for 4 month IOP ck.  Pt is taking Latanoprost at bedtime in the left eye only.  Pt denies any vision changes.   Last edited by Jailene Fox OT on 2/10/2024 11:22 AM.        Patient History:  Past Medical History:   Diagnosis Date    Diabetes (HCC)     Dyslipidemia     Essential hypertension     Primary open-angle glaucoma, left eye, mild stage 8/23/2017 8//23/17 Started on Latanoprost qhs OS by Dr. hC due to thinning of the optic nerve OS on OCT     Psoriasis     Wears glasses        Surgical History: Isra Reese has a past surgical history that includes colonoscopy (11/2018) and colonoscopy (N/A, 11/14/2018) (Procedure: COLONOSCOPY, POSSIBLE BIOPSY, POSSIBLE POLYPECTOMY 48179;  Surgeon: Stephan Perez MD;  [facility]: [facility] [place] [facility] [place]    Family History   Problem Relation Age of Onset    Cancer Father         unknown    Heart Disorder Mother 88        heart attack    Stroke Sister 51    Diabetes Neg     Glaucoma Neg     Macular degeneration Neg        Social History:   Social History     Socioeconomic History    Marital status:    Tobacco Use    Smoking status: Never    Smokeless tobacco: Never   Vaping Use    Vaping Use: Never used   Substance and Sexual Activity    Alcohol use: No    Drug use: No       Medications:  Current Outpatient Medications   Medication Sig Dispense Refill    benzonatate 200 MG Oral Cap Take 1 capsule (200 mg total) by mouth 3 (three) times daily as needed for cough. 30 capsule 0    Omeprazole 40 MG Oral Capsule Delayed Release Take 1 capsule (40 mg total) by mouth daily. 90 capsule 3    latanoprost 0.005 % Ophthalmic Solution INSTILL 1 DROP INTO LEFT EYE AT BEDTIME 3 each 3    VENTOLIN  (90 Base) MCG/ACT Inhalation Aero Soln Inhale 2 puffs into the lungs 3 (three) times daily as needed for Wheezing. 1 each 11    metFORMIN HCl 1000 MG Oral Tab Take 1 tablet (1,000 mg total) by mouth 2  (two) times daily with meals. 180 tablet 3    simvastatin (ZOCOR) 20 MG Oral Tab Take 1 tablet (20 mg total) by mouth nightly. 90 tablet 3    lisinopril 20 MG Oral Tab Take 1 tablet (20 mg total) by mouth daily. 90 tablet 3    aspirin 81 MG Oral Tab EC Take 1 tablet (81 mg total) by mouth daily. 90 tablet 3    fluocinonide 0.05 % External Ointment Apply 1 g topically 2 (two) times daily. 60 g 3    Microlet Lancets ([employer] MICROLET LANCETS) Does not apply Misc Test BS daily DX E 11.9 Non insulin dependent 100 each 11    Glucose Blood (CONTOUR NEXT TEST) In Vitro Strip 1 each by Other route daily. 100 each 11       Allergies:  Allergies   Allergen Reactions    Penicillins HIVES       ROS:       PHYSICAL EXAM:     Base Eye Exam       Visual Acuity (Snellen - Linear)         Right Left    Dist cc 20/50 -2 20/40 +2      Correction: Glasses              Tonometry (Applanation, 11:14 AM)         Right Left    Pressure 14 14              Pachymetry (8/23/2017)         Right Left    Thickness 571/-1.5 572.-1.5              Pupils         Pupils    Right PERRL    Left PERRL                  Slit Lamp and Fundus Exam       External Exam         Right Left    External Normal Normal              Slit Lamp Exam         Right Left    Lids/Lashes Dermatochalasis, Meibomian gland dysfunction Papilloma JABARI LLL, Dermatochalasis, Meibomian gland dysfunction    Conjunctiva/Sclera Normal Normal    Cornea Clear- no K spindles Clear- no K spindles    Anterior Chamber Deep and quiet Deep and quiet    Iris No transillumination defects No transillumination defects              Fundus Exam         Right Left    Disc Good rim  Good rim     C/D Ratio 0.7 0.8                  Refraction       Wearing Rx         Sphere Cylinder Axis Add    Right -12.50 +4.25 180 +3.00    Left -9.75 +1.50 020 +3.00      Type: Progressive bifocal                     ASSESSMENT/PLAN:     Diagnoses and Plan:     Primary open angle glaucoma (POAG) of left eye, mild  stage  IOP is stable.   Continue Latanoprost at bedtime in the left eye.     Will have patient back in 4 months for a pressure check    No orders of the defined types were placed in this encounter.      Meds This Visit:  Requested Prescriptions      No prescriptions requested or ordered in this encounter        Follow up instructions:  Return in about 4 months (around 6/10/2024) for IOP check.    2/10/2024  Scribed by: Jesus Ch MD

## 2024-02-10 NOTE — ASSESSMENT & PLAN NOTE
IOP is stable.   Continue Latanoprost at bedtime in the left eye.     Will have patient back in 4 months for a pressure check   Right ear hearing screen completed date: 2022  Right ear screen method: EOAE (evoked otoacoustic emission)  Right ear screen result: Passed  Right ear screen comment: N/A    Left ear hearing screen completed date: 2022  Left ear screen method: EOAE (evoked otoacoustic emission)  Left ear screen result: Passed  Left ear screen comments: N/A

## 2024-02-10 NOTE — PATIENT INSTRUCTIONS
Primary open angle glaucoma (POAG) of left eye, mild stage  IOP is stable.   Continue Latanoprost at bedtime in the left eye.     Will have patient back in 4 months for a pressure check

## 2024-05-25 ENCOUNTER — LAB ENCOUNTER (OUTPATIENT)
Dept: LAB | Age: 74
End: 2024-05-25
Attending: INTERNAL MEDICINE

## 2024-05-25 DIAGNOSIS — R53.83 FATIGUE, UNSPECIFIED TYPE: ICD-10-CM

## 2024-05-25 DIAGNOSIS — E11.9 TYPE 2 DIABETES MELLITUS WITHOUT COMPLICATION, WITHOUT LONG-TERM CURRENT USE OF INSULIN (HCC): ICD-10-CM

## 2024-05-25 LAB
ALBUMIN SERPL-MCNC: 4.5 G/DL (ref 3.2–4.8)
ALBUMIN/GLOB SERPL: 1.4 {RATIO} (ref 1–2)
ALP LIVER SERPL-CCNC: 79 U/L
ALT SERPL-CCNC: 13 U/L
ANION GAP SERPL CALC-SCNC: 7 MMOL/L (ref 0–18)
AST SERPL-CCNC: 12 U/L (ref ?–34)
BASOPHILS # BLD AUTO: 0.09 X10(3) UL (ref 0–0.2)
BASOPHILS NFR BLD AUTO: 0.9 %
BILIRUB SERPL-MCNC: 0.3 MG/DL (ref 0.2–1.1)
BUN BLD-MCNC: 43 MG/DL (ref 9–23)
BUN/CREAT SERPL: 20.9 (ref 10–20)
CALCIUM BLD-MCNC: 9.2 MG/DL (ref 8.7–10.4)
CHLORIDE SERPL-SCNC: 111 MMOL/L (ref 98–112)
CHOLEST SERPL-MCNC: 110 MG/DL (ref ?–200)
CO2 SERPL-SCNC: 18 MMOL/L (ref 21–32)
CREAT BLD-MCNC: 2.06 MG/DL
CREAT UR-SCNC: 70.5 MG/DL
DEPRECATED RDW RBC AUTO: 45.4 FL (ref 35.1–46.3)
EGFRCR SERPLBLD CKD-EPI 2021: 34 ML/MIN/1.73M2 (ref 60–?)
EOSINOPHIL # BLD AUTO: 0.65 X10(3) UL (ref 0–0.7)
EOSINOPHIL NFR BLD AUTO: 6.7 %
ERYTHROCYTE [DISTWIDTH] IN BLOOD BY AUTOMATED COUNT: 13.2 % (ref 11–15)
EST. AVERAGE GLUCOSE BLD GHB EST-MCNC: 154 MG/DL (ref 68–126)
FASTING PATIENT LIPID ANSWER: YES
FASTING STATUS PATIENT QL REPORTED: YES
GLOBULIN PLAS-MCNC: 3.2 G/DL (ref 2–3.5)
GLUCOSE BLD-MCNC: 122 MG/DL (ref 70–99)
HBA1C MFR BLD: 7 % (ref ?–5.7)
HCT VFR BLD AUTO: 38.6 %
HDLC SERPL-MCNC: 31 MG/DL (ref 40–59)
HGB BLD-MCNC: 13 G/DL
IMM GRANULOCYTES # BLD AUTO: 0.05 X10(3) UL (ref 0–1)
IMM GRANULOCYTES NFR BLD: 0.5 %
LDLC SERPL CALC-MCNC: 58 MG/DL (ref ?–100)
LYMPHOCYTES # BLD AUTO: 2.37 X10(3) UL (ref 1–4)
LYMPHOCYTES NFR BLD AUTO: 24.3 %
MCH RBC QN AUTO: 31.8 PG (ref 26–34)
MCHC RBC AUTO-ENTMCNC: 33.7 G/DL (ref 31–37)
MCV RBC AUTO: 94.4 FL
MICROALBUMIN UR-MCNC: 1.1 MG/DL
MICROALBUMIN/CREAT 24H UR-RTO: 15.6 UG/MG (ref ?–30)
MONOCYTES # BLD AUTO: 0.64 X10(3) UL (ref 0.1–1)
MONOCYTES NFR BLD AUTO: 6.6 %
NEUTROPHILS # BLD AUTO: 5.97 X10 (3) UL (ref 1.5–7.7)
NEUTROPHILS # BLD AUTO: 5.97 X10(3) UL (ref 1.5–7.7)
NEUTROPHILS NFR BLD AUTO: 61 %
NONHDLC SERPL-MCNC: 79 MG/DL (ref ?–130)
OSMOLALITY SERPL CALC.SUM OF ELEC: 294 MOSM/KG (ref 275–295)
PLATELET # BLD AUTO: 255 10(3)UL (ref 150–450)
POTASSIUM SERPL-SCNC: 5.5 MMOL/L (ref 3.5–5.1)
PROT SERPL-MCNC: 7.7 G/DL (ref 5.7–8.2)
RBC # BLD AUTO: 4.09 X10(6)UL
SODIUM SERPL-SCNC: 136 MMOL/L (ref 136–145)
TRIGL SERPL-MCNC: 113 MG/DL (ref 30–149)
TSI SER-ACNC: 1.33 MIU/ML (ref 0.55–4.78)
VLDLC SERPL CALC-MCNC: 17 MG/DL (ref 0–30)
WBC # BLD AUTO: 9.8 X10(3) UL (ref 4–11)

## 2024-05-25 PROCEDURE — 36415 COLL VENOUS BLD VENIPUNCTURE: CPT

## 2024-05-25 PROCEDURE — 82043 UR ALBUMIN QUANTITATIVE: CPT

## 2024-05-25 PROCEDURE — 80053 COMPREHEN METABOLIC PANEL: CPT

## 2024-05-25 PROCEDURE — 80061 LIPID PANEL: CPT

## 2024-05-25 PROCEDURE — 84443 ASSAY THYROID STIM HORMONE: CPT

## 2024-05-25 PROCEDURE — 85025 COMPLETE CBC W/AUTO DIFF WBC: CPT

## 2024-05-25 PROCEDURE — 82570 ASSAY OF URINE CREATININE: CPT

## 2024-05-25 PROCEDURE — 83036 HEMOGLOBIN GLYCOSYLATED A1C: CPT

## 2024-05-28 ENCOUNTER — TELEPHONE (OUTPATIENT)
Dept: INTERNAL MEDICINE CLINIC | Facility: CLINIC | Age: 74
End: 2024-05-28

## 2024-05-28 DIAGNOSIS — N17.9 AKI (ACUTE KIDNEY INJURY) (HCC): Primary | ICD-10-CM

## 2024-05-28 NOTE — TELEPHONE ENCOUNTER
Please notify pt that his blood work is overall ok but he was significantly dehydrated---kidney function looks worse.   Would recommend that he push more fluids and he can repeat a blood test Thursday, or he can wait until he sees me to repeat. No fasting for this blood test

## 2024-05-31 ENCOUNTER — OFFICE VISIT (OUTPATIENT)
Dept: INTERNAL MEDICINE CLINIC | Facility: CLINIC | Age: 74
End: 2024-05-31

## 2024-05-31 VITALS
TEMPERATURE: 99 F | OXYGEN SATURATION: 99 % | HEART RATE: 82 BPM | SYSTOLIC BLOOD PRESSURE: 118 MMHG | HEIGHT: 67 IN | DIASTOLIC BLOOD PRESSURE: 64 MMHG | WEIGHT: 209 LBS | BODY MASS INDEX: 32.8 KG/M2

## 2024-05-31 DIAGNOSIS — E11.65 TYPE 2 DIABETES MELLITUS WITH HYPERGLYCEMIA, WITHOUT LONG-TERM CURRENT USE OF INSULIN (HCC): Primary | ICD-10-CM

## 2024-05-31 DIAGNOSIS — E78.5 DYSLIPIDEMIA: ICD-10-CM

## 2024-05-31 DIAGNOSIS — N17.9 AKI (ACUTE KIDNEY INJURY) (HCC): ICD-10-CM

## 2024-05-31 DIAGNOSIS — I10 ESSENTIAL HYPERTENSION: ICD-10-CM

## 2024-05-31 PROCEDURE — G2211 COMPLEX E/M VISIT ADD ON: HCPCS | Performed by: INTERNAL MEDICINE

## 2024-05-31 PROCEDURE — 99214 OFFICE O/P EST MOD 30 MIN: CPT | Performed by: INTERNAL MEDICINE

## 2024-05-31 RX ORDER — LISINOPRIL 20 MG/1
20 TABLET ORAL DAILY
Qty: 90 TABLET | Refills: 3 | Status: CANCELLED | OUTPATIENT
Start: 2024-05-31

## 2024-05-31 NOTE — PROGRESS NOTES
Isra Reese is a 73 year old male  Chief Complaint   Patient presents with    Checkup     6 month       HPI:     Isra Reese is a 73 year old male who presents for a 6 month follow up visit.     Omeprazole is helping heartburn/cough    Having more knee pain        Wt Readings from Last 6 Encounters:   05/31/24 209 lb (94.8 kg)   01/22/24 191 lb (86.6 kg)   11/15/23 208 lb (94.3 kg)   10/06/23 209 lb (94.8 kg)   03/31/23 206 lb (93.4 kg)   09/21/22 207 lb 3.2 oz (94 kg)     Body mass index is 32.73 kg/m².       Current Outpatient Medications   Medication Sig Dispense Refill    Omeprazole 40 MG Oral Capsule Delayed Release Take 1 capsule (40 mg total) by mouth daily. 90 capsule 3    latanoprost 0.005 % Ophthalmic Solution INSTILL 1 DROP INTO LEFT EYE AT BEDTIME 3 each 3    VENTOLIN  (90 Base) MCG/ACT Inhalation Aero Soln Inhale 2 puffs into the lungs 3 (three) times daily as needed for Wheezing. 1 each 11    metFORMIN HCl 1000 MG Oral Tab Take 1 tablet (1,000 mg total) by mouth 2 (two) times daily with meals. 180 tablet 3    simvastatin (ZOCOR) 20 MG Oral Tab Take 1 tablet (20 mg total) by mouth nightly. 90 tablet 3    lisinopril 20 MG Oral Tab Take 1 tablet (20 mg total) by mouth daily. 90 tablet 3    aspirin 81 MG Oral Tab EC Take 1 tablet (81 mg total) by mouth daily. 90 tablet 3    fluocinonide 0.05 % External Ointment Apply 1 g topically 2 (two) times daily. 60 g 3    Microlet Lancets (BRANT MICROLET LANCETS) Does not apply Misc Test BS daily DX E 11.9 Non insulin dependent 100 each 11    Glucose Blood (CONTOUR NEXT TEST) In Vitro Strip 1 each by Other route daily. 100 each 11      Past Medical History:    Diabetes (HCC)    Dyslipidemia    Essential hypertension    Primary open-angle glaucoma, left eye, mild stage    8//23/17 Started on Latanoprost qhs OS by Dr. Ch due to thinning of the optic nerve OS on OCT     Psoriasis    Wears glasses      Past Surgical History:   Procedure Laterality Date     Colonoscopy  11/2018    Colonoscopy N/A 11/14/2018    Procedure: COLONOSCOPY, POSSIBLE BIOPSY, POSSIBLE POLYPECTOMY 34615;  Surgeon: Stephan Perez MD;  [facility] [facility]      Family History   Problem Relation Age of Onset    Cancer Father         unknown    Heart Disorder Mother 88        heart attack    Stroke Sister 51    Diabetes Neg     Glaucoma Neg     Macular degeneration Neg       Social History:  Social History     Socioeconomic History    Marital status:    Tobacco Use    Smoking status: Never    Smokeless tobacco: Never   Vaping Use    Vaping status: Never Used   Substance and Sexual Activity    Alcohol use: No    Drug use: No           REVIEW OF SYSTEMS:   GENERAL: feels well otherwise  EYES:denies blurred vision or double vision  HEENT: denies nasal congestion, sinus pain, ST, or sore throat  LUNGS: denies shortness of breath, cough or wheezing  CARDIOVASCULAR: denies chest pain or pressure or palpitations  GI: denies abdominal pain, N/V, diarrhea, constipation, hematochezia or melena  : denies nocturia or changes in stream  NEURO: denies headaches, dizziness or focal weakness  PSYCHE: denies anxiety or depression  SKIN: denies lesions, rashes or wounds    EXAM:   /64   Pulse 82   Temp 98.5 °F (36.9 °C)   Ht 5' 7\" (1.702 m)   Wt 209 lb (94.8 kg)   SpO2 99%   BMI 32.73 kg/m²     GENERAL: well developed, well nourished, in no apparent distress  NECK: supple, no cervical or supraclavicular lymphadenopathy, no carotid bruits, no thyromegaly  LUNGS: clear to auscultation  CARDIO: RRR, normal S1S2, no gallops or murmurs  EXTREMITIES: no edema, +2 radial & DP pulses bilaterally  NEURO: A&O x 3, moves all 4 extremities normally      ASSESSMENT AND PLAN:       1.  type 2 diabetes mellitus (HCC)  metformin 1000mg bid. a1c 5.9>6.5>6.9>6.8>7.0  Await repeat renal function before adjusting medications  Discussed diet modification    2. Dyslipidemia  aspirin 81mg daily,  zocor 20mg daily.    3. Hypertension  Continue lisinopril 20mg daily  -repeat bmp  Encouraged fluid intake    4. psoriasis  Lidex prn. Asked him to consider dermatology evaluation.     5. Health maintenance  UTD on tdap and pneumovax; had normal colonoscopy with Dr. Perez 11/2018 (repeat due in 10 years)    6. Acute kidney injury  Repeat labs in a week or so  Increase water intake    7. Knee pain  Referred to Dr. Quinones/Mendy/[facility] group  No NSAIDs d/t elevated Cr    Follow up in 6 months for medicare physical    Elyse Rider DO  5/31/2024  7:54 AM

## 2024-06-01 ENCOUNTER — LAB ENCOUNTER (OUTPATIENT)
Dept: LAB | Age: 74
End: 2024-06-01
Attending: INTERNAL MEDICINE
Payer: MEDICARE

## 2024-06-01 DIAGNOSIS — N17.9 AKI (ACUTE KIDNEY INJURY) (HCC): ICD-10-CM

## 2024-06-01 LAB
ANION GAP SERPL CALC-SCNC: 6 MMOL/L (ref 0–18)
BUN BLD-MCNC: 32 MG/DL (ref 9–23)
BUN/CREAT SERPL: 15.8 (ref 10–20)
CALCIUM BLD-MCNC: 9.2 MG/DL (ref 8.7–10.4)
CHLORIDE SERPL-SCNC: 109 MMOL/L (ref 98–112)
CO2 SERPL-SCNC: 20 MMOL/L (ref 21–32)
CREAT BLD-MCNC: 2.03 MG/DL
EGFRCR SERPLBLD CKD-EPI 2021: 34 ML/MIN/1.73M2 (ref 60–?)
FASTING STATUS PATIENT QL REPORTED: NO
GLUCOSE BLD-MCNC: 138 MG/DL (ref 70–99)
OSMOLALITY SERPL CALC.SUM OF ELEC: 289 MOSM/KG (ref 275–295)
POTASSIUM SERPL-SCNC: 5.1 MMOL/L (ref 3.5–5.1)
SODIUM SERPL-SCNC: 135 MMOL/L (ref 136–145)

## 2024-06-01 PROCEDURE — 36415 COLL VENOUS BLD VENIPUNCTURE: CPT

## 2024-06-01 PROCEDURE — 80048 BASIC METABOLIC PNL TOTAL CA: CPT

## 2024-06-03 ENCOUNTER — TELEPHONE (OUTPATIENT)
Dept: INTERNAL MEDICINE CLINIC | Facility: CLINIC | Age: 74
End: 2024-06-03

## 2024-06-03 DIAGNOSIS — N17.9 AKI (ACUTE KIDNEY INJURY) (HCC): ICD-10-CM

## 2024-06-03 DIAGNOSIS — E11.65 TYPE 2 DIABETES MELLITUS WITH HYPERGLYCEMIA, WITHOUT LONG-TERM CURRENT USE OF INSULIN (HCC): Primary | ICD-10-CM

## 2024-06-03 DIAGNOSIS — I10 ESSENTIAL HYPERTENSION: ICD-10-CM

## 2024-06-03 DIAGNOSIS — R53.83 FATIGUE, UNSPECIFIED TYPE: ICD-10-CM

## 2024-06-03 DIAGNOSIS — E78.5 DYSLIPIDEMIA: ICD-10-CM

## 2024-06-03 RX ORDER — GLIPIZIDE 2.5 MG/1
2.5 TABLET, EXTENDED RELEASE ORAL
Qty: 90 TABLET | Refills: 1 | Status: SHIPPED | OUTPATIENT
Start: 2024-06-03

## 2024-06-03 NOTE — TELEPHONE ENCOUNTER
Please notify pt that his potassium is better, but Creatinine (kidney function) is still elevated.     I would have him cut down his metformin to 500mg twice daily   I would have him start glipizide 2.5mg daily (I sent in this new rx)    I would have him see a nephrologist--Dr. Dalton or Dr. Whitley    I will still plan on repeat blood work in 6 months

## 2024-06-11 RX ORDER — LISINOPRIL 20 MG/1
20 TABLET ORAL DAILY
Qty: 90 TABLET | Refills: 3 | Status: SHIPPED | OUTPATIENT
Start: 2024-06-11

## 2024-06-11 NOTE — TELEPHONE ENCOUNTER
Refill request is for a maintenance medication and has met the criteria specified in the Ambulatory Medication Refill Standing Order for eligibility, visits, laboratory, alerts and was sent to the requested pharmacy.    Requested Prescriptions     Signed Prescriptions Disp Refills    lisinopril 20 MG Oral Tab 90 tablet 3     Sig: Take 1 tablet (20 mg total) by mouth daily.     Authorizing Provider: CRISTIANE MULLEN     Ordering User: JAVED VARGAS

## 2024-07-13 ENCOUNTER — OFFICE VISIT (OUTPATIENT)
Dept: OPHTHALMOLOGY | Facility: CLINIC | Age: 74
End: 2024-07-13
Payer: MEDICARE

## 2024-07-13 DIAGNOSIS — H40.1121 PRIMARY OPEN ANGLE GLAUCOMA (POAG) OF LEFT EYE, MILD STAGE: Primary | ICD-10-CM

## 2024-07-13 PROCEDURE — 99213 OFFICE O/P EST LOW 20 MIN: CPT | Performed by: OPHTHALMOLOGY

## 2024-07-13 RX ORDER — LATANOPROST 50 UG/ML
SOLUTION/ DROPS OPHTHALMIC
Qty: 3 EACH | Refills: 3 | Status: SHIPPED | OUTPATIENT
Start: 2024-07-13

## 2024-07-13 NOTE — PATIENT INSTRUCTIONS
Primary open angle glaucoma (POAG) of left eye, mild stage  IOP is stable.   Continue Latanoprost at bedtime in the left eye.     Will see patient in 4 months for visual field, OCT, diabetic exam and photos.

## 2024-07-13 NOTE — ASSESSMENT & PLAN NOTE
IOP is stable.   Continue Latanoprost at bedtime in the left eye.     Will see patient in 4 months for visual field, OCT, diabetic exam and photos.

## 2024-07-13 NOTE — PROGRESS NOTES
Isra Reese is a 74 year old male.    HPI:     HPI    EP/ here for an IOP check.   States the vision seems sable and no changes noted.  Using Latanoprost eye drops once at bed time only in the left eye.    Last edited by Mary Grace Lunsford OT on 7/13/2024 10:31 AM.        Patient History:  Past Medical History:    Diabetes (HCC)    Dyslipidemia    Essential hypertension    Primary open-angle glaucoma, left eye, mild stage    8//23/17 Started on Latanoprost qhs OS by Dr. Ch due to thinning of the optic nerve OS on OCT     Psoriasis    Wears glasses       Surgical History: Isra Reese has a past surgical history that includes colonoscopy (11/2018) and colonoscopy (N/A, 11/14/2018) (Procedure: COLONOSCOPY, POSSIBLE BIOPSY, POSSIBLE POLYPECTOMY 15667;  Surgeon: Stephan Perez MD;  Location: [facility] [place] [facility] [place]).    Family History   Problem Relation Age of Onset    Cancer Father         unknown    Heart Disorder Mother 88        heart attack    Stroke Sister 51    Diabetes Neg     Glaucoma Neg     Macular degeneration Neg        Social History:   Social History     Socioeconomic History    Marital status:    Tobacco Use    Smoking status: Never    Smokeless tobacco: Never   Vaping Use    Vaping status: Never Used   Substance and Sexual Activity    Alcohol use: No    Drug use: No       Medications:  Current Outpatient Medications   Medication Sig Dispense Refill    latanoprost 0.005 % Ophthalmic Solution INSTILL 1 DROP INTO LEFT EYE AT BEDTIME 3 each 3    lisinopril 20 MG Oral Tab Take 1 tablet (20 mg total) by mouth daily. 90 tablet 3    glipiZIDE ER 2.5 MG Oral Tablet 24 Hr Take 1 tablet (2.5 mg total) by mouth daily with breakfast. 90 tablet 1    Omeprazole 40 MG Oral Capsule Delayed Release Take 1 capsule (40 mg total) by mouth daily. 90 capsule 3    VENTOLIN  (90 Base) MCG/ACT Inhalation Aero Soln Inhale 2 puffs into the lungs 3 (three) times daily as needed for Wheezing. 1 each 11     metFORMIN HCl 1000 MG Oral Tab Take 1 tablet (1,000 mg total) by mouth 2 (two) times daily with meals. 180 tablet 3    simvastatin (ZOCOR) 20 MG Oral Tab Take 1 tablet (20 mg total) by mouth nightly. 90 tablet 3    aspirin 81 MG Oral Tab EC Take 1 tablet (81 mg total) by mouth daily. 90 tablet 3    fluocinonide 0.05 % External Ointment Apply 1 g topically 2 (two) times daily. 60 g 3    Microlet Lancets ([employer] MICROLET LANCETS) Does not apply Misc Test BS daily DX E 11.9 Non insulin dependent 100 each 11    Glucose Blood (CONTOUR NEXT TEST) In Vitro Strip 1 each by Other route daily. 100 each 11       Allergies:  Allergies   Allergen Reactions    Penicillins HIVES    Pollen ITCHING       ROS:       PHYSICAL EXAM:     Base Eye Exam       Visual Acuity (Snellen - Linear)         Right Left    Dist cc 20/60 -1 20/40 +2    Dist ph cc 20/50 -2 20/30 -2      Correction: Glasses              Tonometry (Applanation, 10:37 AM)         Right Left    Pressure 14 19              Pachymetry (8/23/2017)         Right Left    Thickness 571/-1.5 572.-1.5              Pupils         Pupils    Right PERRL    Left PERRL              Extraocular Movement         Right Left     Full Full                  Slit Lamp and Fundus Exam       External Exam         Right Left    External Normal Normal              Slit Lamp Exam         Right Left    Lids/Lashes Dermatochalasis, Meibomian gland dysfunction Papilloma JABARI LLL, Dermatochalasis, Meibomian gland dysfunction    Conjunctiva/Sclera Normal Normal    Cornea Clear- no K spindles Clear- no K spindles    Anterior Chamber Deep and quiet Deep and quiet    Iris No transillumination defects No transillumination defects              Fundus Exam         Right Left    Disc Good rim  Good rim     C/D Ratio 0.7 0.8                  Refraction       Wearing Rx         Sphere Cylinder Axis Add    Right -12.50 +4.25 180 +3.00    Left -9.75 +1.50 020 +3.00      Age: 2yr    Type: Progressive bifocal                      ASSESSMENT/PLAN:     Diagnoses and Plan:     Primary open angle glaucoma (POAG) of left eye, mild stage  IOP is stable.   Continue Latanoprost at bedtime in the left eye.     Will see patient in 4 months for visual field, OCT, diabetic exam and photos.      No orders of the defined types were placed in this encounter.      Meds This Visit:  Requested Prescriptions     Signed Prescriptions Disp Refills    latanoprost 0.005 % Ophthalmic Solution 3 each 3     Sig: INSTILL 1 DROP INTO LEFT EYE AT BEDTIME        Follow up instructions:  Return in about 4 months (around 11/13/2024) for Visual Field, OCT, Diabetic eye exam, Photos.    7/13/2024  Scribed by: Jesus Ch MD

## 2024-07-15 ENCOUNTER — HOSPITAL ENCOUNTER (OUTPATIENT)
Dept: GENERAL RADIOLOGY | Facility: HOSPITAL | Age: 74
Discharge: HOME | End: 2024-07-15
Attending: ORTHOPAEDIC SURGERY
Payer: MEDICARE

## 2024-07-15 ENCOUNTER — OFFICE VISIT (OUTPATIENT)
Dept: ORTHOPEDICS CLINIC | Facility: CLINIC | Age: 74
End: 2024-07-15
Payer: MEDICARE

## 2024-07-15 VITALS
DIASTOLIC BLOOD PRESSURE: 82 MMHG | WEIGHT: 208 LBS | HEIGHT: 67 IN | SYSTOLIC BLOOD PRESSURE: 130 MMHG | HEART RATE: 78 BPM | BODY MASS INDEX: 32.65 KG/M2

## 2024-07-15 DIAGNOSIS — M25.561 CHRONIC PAIN OF BOTH KNEES: Primary | ICD-10-CM

## 2024-07-15 DIAGNOSIS — M25.562 CHRONIC PAIN OF BOTH KNEES: Primary | ICD-10-CM

## 2024-07-15 DIAGNOSIS — M25.562 CHRONIC PAIN OF BOTH KNEES: ICD-10-CM

## 2024-07-15 DIAGNOSIS — G89.29 CHRONIC PAIN OF BOTH KNEES: Primary | ICD-10-CM

## 2024-07-15 DIAGNOSIS — M23.204 DEGENERATIVE TEAR OF MEDIAL MENISCUS, LEFT: ICD-10-CM

## 2024-07-15 DIAGNOSIS — E66.09 CLASS 1 OBESITY DUE TO EXCESS CALORIES WITH SERIOUS COMORBIDITY AND BODY MASS INDEX (BMI) OF 32.0 TO 32.9 IN ADULT: ICD-10-CM

## 2024-07-15 DIAGNOSIS — M17.11 OSTEOARTHRITIS OF RIGHT PATELLOFEMORAL JOINT: ICD-10-CM

## 2024-07-15 DIAGNOSIS — G89.29 CHRONIC PAIN OF BOTH KNEES: ICD-10-CM

## 2024-07-15 DIAGNOSIS — M23.203 DEGENERATIVE TEAR OF MEDIAL MENISCUS, RIGHT: ICD-10-CM

## 2024-07-15 DIAGNOSIS — M17.12 OSTEOARTHRITIS OF LEFT PATELLOFEMORAL JOINT: ICD-10-CM

## 2024-07-15 DIAGNOSIS — M25.561 CHRONIC PAIN OF BOTH KNEES: ICD-10-CM

## 2024-07-15 PROCEDURE — 73562 X-RAY EXAM OF KNEE 3: CPT | Performed by: ORTHOPAEDIC SURGERY

## 2024-07-15 RX ORDER — TRIAMCINOLONE ACETONIDE 40 MG/ML
40 INJECTION, SUSPENSION INTRA-ARTICULAR; INTRAMUSCULAR ONCE
Status: COMPLETED | OUTPATIENT
Start: 2024-07-15 | End: 2024-07-15

## 2024-07-15 RX ADMIN — TRIAMCINOLONE ACETONIDE 40 MG: 40 INJECTION, SUSPENSION INTRA-ARTICULAR; INTRAMUSCULAR at 09:46:00

## 2024-07-15 NOTE — H&P
NURSING INTAKE COMMENTS:   Chief Complaint   Patient presents with    Knee Pain     Consult bilateral knee on and off pain 6/10. Onset 2 yrs ago, but on last 6 months got worse, no injury.       HPI: This 74 year old male presents today for bilateral knee pain left worse than right.  He points to the medial joint lines specifically.  He denies instability or trauma.  The pain started a few years ago getting worse in the last 6 months without trauma.  He denies catching or locking or swelling.  He has not had therapy or injections.    He lives independently with his wife in a condo with elevator.  He works in sales as well as a warehouse.  While technically he supposed to lift more than 10 to 15 pounds, the younger guys cover for him and he lifts only about 10 to 15 pounds.  He has diabetes and his last hemoglobin A1c was 7.0.  He denies neuropathy.  He has a BMI of 32.58.  He is a non-smoker.  He leads a sedentary lifestyle outside of work.    Past Medical History:    Diabetes (HCC)    Dyslipidemia    Essential hypertension    Primary open-angle glaucoma, left eye, mild stage    8//23/17 Started on Latanoprost qhs OS by Dr. Ch due to thinning of the optic nerve OS on OCT     Psoriasis    Wears glasses     Past Surgical History:   Procedure Laterality Date    Colonoscopy  11/2018    Colonoscopy N/A 11/14/2018    Procedure: COLONOSCOPY, POSSIBLE BIOPSY, POSSIBLE POLYPECTOMY 12722;  Surgeon: Stephan Perez MD;  [facility]     Current Outpatient Medications   Medication Sig Dispense Refill    latanoprost 0.005 % Ophthalmic Solution INSTILL 1 DROP INTO LEFT EYE AT BEDTIME 3 each 3    lisinopril 20 MG Oral Tab Take 1 tablet (20 mg total) by mouth daily. 90 tablet 3    glipiZIDE ER 2.5 MG Oral Tablet 24 Hr Take 1 tablet (2.5 mg total) by mouth daily with breakfast. 90 tablet 1    Omeprazole 40 MG Oral Capsule Delayed Release Take 1 capsule (40 mg total) by mouth daily. 90 capsule 3     VENTOLIN  (90 Base) MCG/ACT Inhalation Aero Soln Inhale 2 puffs into the lungs 3 (three) times daily as needed for Wheezing. 1 each 11    metFORMIN HCl 1000 MG Oral Tab Take 1 tablet (1,000 mg total) by mouth 2 (two) times daily with meals. 180 tablet 3    simvastatin (ZOCOR) 20 MG Oral Tab Take 1 tablet (20 mg total) by mouth nightly. 90 tablet 3    aspirin 81 MG Oral Tab EC Take 1 tablet (81 mg total) by mouth daily. 90 tablet 3    fluocinonide 0.05 % External Ointment Apply 1 g topically 2 (two) times daily. 60 g 3    Microlet Lancets ([employer] MICROLET LANCETS) Does not apply Misc Test BS daily DX E 11.9 Non insulin dependent 100 each 11    Glucose Blood (CONTOUR NEXT TEST) In Vitro Strip 1 each by Other route daily. 100 each 11     Allergies   Allergen Reactions    Penicillins HIVES    Pollen ITCHING     Family History   Problem Relation Age of Onset    Cancer Father         unknown    Heart Disorder Mother 88        heart attack    Stroke Sister 51    Diabetes Neg     Glaucoma Neg     Macular degeneration Neg      No family Hx of DVT/PE    Social History     Occupational History    Not on file   Tobacco Use    Smoking status: Never    Smokeless tobacco: Never   Vaping Use    Vaping status: Never Used   Substance and Sexual Activity    Alcohol use: No    Drug use: No    Sexual activity: Not on file        Review of Systems:  GENERAL: feels generally well, no significant weight loss or weight gain  SKIN: no ulcerated or worrisome skin lesions  EYES:denies blurred vision or double vision  HEENT: denies new nasal congestion, sinus pain or ST  LUNGS: denies shortness of breath  CARDIOVASCULAR: denies chest pain  GI: no hematemesis, no worsening heartburn, no diarrhea  : no dysuria, no blood in urine, no difficulty urinating, no incontinence  MUSCULOSKELETAL: no other musculoskeletal complaints other than in HPI  NEURO: no numbness or tingling, no weakness or balance disorder  PSYCHE: no depression or  anxiety  HEMATOLOGIC: no hx of blood dyscrasia, no Hx DVT/PE  ENDOCRINE: no thyroid or diabetes issues  ALL/ASTHMA: no new hx of severe allergy or asthma    Physical Examination:    /82 (BP Location: Right arm, Patient Position: Sitting, Cuff Size: adult)   Pulse 78   Ht 5' 7\" (1.702 m)   Wt 208 lb (94.3 kg)   BMI 32.58 kg/m²   Constitutional: appears well hydrated, alert and responsive, no acute distress noted  Extremities: The right knee has a psoriatic plaque over the patella without signs of infection.  The left knee did not.  Neither knee had asymmetric warmth or effusion.  The skin of both legs was pretty healthy and there was no pitting edema or calf tenderness.  Musculoskeletal: Both knees had good motion 0 to 130 degrees without instability or patellar tracking issues.  Despite the patellofemoral arthritis on imaging, he had no patellofemoral grind pain and no lateral tenderness bilaterally.  Both knees were tender in the posterior medial joint lines particularly with flexion.  No instability to either knee.  Neurological: Normal motor and sensory bilateral lower extremities.    Imaging: X-rays show right greater than left patellofemoral osteoarthritis seen best on the skyline view.  The medial lateral joint lines are pretty well-preserved however.      No results found.     Lab Results   Component Value Date    WBC 9.8 05/25/2024    HGB 13.0 05/25/2024    .0 05/25/2024      Lab Results   Component Value Date     (H) 06/01/2024    BUN 32 (H) 06/01/2024    CREATSERUM 2.03 (H) 06/01/2024    GFRNAA 55 (L) 03/16/2022    GFRAA 63 03/16/2022        Assessment and Plan:  Diagnoses and all orders for this visit:    Chronic pain of both knees  -     Cancel: XR KNEE COMPLETE BILAT EM(CPT=73564-50); Future  -     Arthrocentesis aspiration and injection major Left joint bursa w/o US  -     triamcinolone acetonide (Kenalog-40) 40 MG/ML injection 40 mg  -     PHYSICAL THERAPY -  INTERNAL    Osteoarthritis of left patellofemoral joint  -     Arthrocentesis aspiration and injection major Left joint bursa w/o US  -     triamcinolone acetonide (Kenalog-40) 40 MG/ML injection 40 mg  -     PHYSICAL THERAPY - INTERNAL    Degenerative tear of medial meniscus, left  -     Arthrocentesis aspiration and injection major Left joint bursa w/o US  -     triamcinolone acetonide (Kenalog-40) 40 MG/ML injection 40 mg  -     PHYSICAL THERAPY - INTERNAL    Osteoarthritis of right patellofemoral joint  -     PHYSICAL THERAPY - INTERNAL    Degenerative tear of medial meniscus, right  -     PHYSICAL THERAPY - INTERNAL        Assessment: Likely degenerative medial meniscus tears bilaterally and patellofemoral osteoarthritis right worse than left.  Symptomatically however left hurts worse than right.    Plan: We talked about dietitian for his obesity which will help reduce his pain but he declined.  He said he saw dietitian in the past.  We talked about calorie counting portion control.  I discussed weight loss in terms of left knee pain particularly in the context of patellofemoral arthritis and the fulcrum effect.    I also recommended physical therapy and cortisone injection.  He agreed.  Because he is diabetic I recommended staging the injections about 3 weeks apart.  Today he chose the left knee.  Aspiration of the left knee was negative he tolerated the injection bupivacaine 0.5% 5 cc and Kenalog 1 cc without issue.    He will do the home exercise program the learns in therapy.  If he wants the right knee injected he will wait about 3 weeks.  If nothing is helping we may talk about MRI scans of the knees in the future but for now I will see him as needed.    Follow Up: No follow-ups on file.    Edil Brooke MD

## 2024-07-15 NOTE — PROGRESS NOTES
Per verbal order from Dr. Brooke, draw up 5ml of 0.5% Marcaine and 1ml of Kenalog 40 for cortisone injection to left knee Vignesh MURRAY MA  Patient provided education handout for cortisone injection.

## 2024-11-16 ENCOUNTER — LAB ENCOUNTER (OUTPATIENT)
Dept: LAB | Age: 74
End: 2024-11-16
Attending: INTERNAL MEDICINE
Payer: MEDICARE

## 2024-11-16 DIAGNOSIS — N17.9 AKI (ACUTE KIDNEY INJURY) (HCC): ICD-10-CM

## 2024-11-16 DIAGNOSIS — E78.5 DYSLIPIDEMIA: ICD-10-CM

## 2024-11-16 DIAGNOSIS — E11.65 TYPE 2 DIABETES MELLITUS WITH HYPERGLYCEMIA, WITHOUT LONG-TERM CURRENT USE OF INSULIN (HCC): ICD-10-CM

## 2024-11-16 DIAGNOSIS — I10 ESSENTIAL HYPERTENSION: ICD-10-CM

## 2024-11-16 DIAGNOSIS — R53.83 FATIGUE, UNSPECIFIED TYPE: ICD-10-CM

## 2024-11-16 LAB
ALBUMIN SERPL-MCNC: 4.5 G/DL (ref 3.2–4.8)
ALBUMIN/GLOB SERPL: 1.4 {RATIO} (ref 1–2)
ALP LIVER SERPL-CCNC: 76 U/L
ALT SERPL-CCNC: 10 U/L
ANION GAP SERPL CALC-SCNC: 9 MMOL/L (ref 0–18)
AST SERPL-CCNC: 15 U/L (ref ?–34)
BASOPHILS # BLD AUTO: 0.08 X10(3) UL (ref 0–0.2)
BASOPHILS NFR BLD AUTO: 1.1 %
BILIRUB SERPL-MCNC: 0.4 MG/DL (ref 0.2–1.1)
BUN BLD-MCNC: 35 MG/DL (ref 9–23)
BUN/CREAT SERPL: 16.8 (ref 10–20)
CALCIUM BLD-MCNC: 9.7 MG/DL (ref 8.7–10.4)
CHLORIDE SERPL-SCNC: 110 MMOL/L (ref 98–112)
CHOLEST SERPL-MCNC: 117 MG/DL (ref ?–200)
CO2 SERPL-SCNC: 19 MMOL/L (ref 21–32)
CREAT BLD-MCNC: 2.08 MG/DL
CREAT UR-SCNC: 111.7 MG/DL
DEPRECATED RDW RBC AUTO: 47.1 FL (ref 35.1–46.3)
EGFRCR SERPLBLD CKD-EPI 2021: 33 ML/MIN/1.73M2 (ref 60–?)
EOSINOPHIL # BLD AUTO: 0.4 X10(3) UL (ref 0–0.7)
EOSINOPHIL NFR BLD AUTO: 5.3 %
ERYTHROCYTE [DISTWIDTH] IN BLOOD BY AUTOMATED COUNT: 13.8 % (ref 11–15)
EST. AVERAGE GLUCOSE BLD GHB EST-MCNC: 151 MG/DL (ref 68–126)
FASTING PATIENT LIPID ANSWER: YES
FASTING STATUS PATIENT QL REPORTED: YES
GLOBULIN PLAS-MCNC: 3.3 G/DL (ref 2–3.5)
GLUCOSE BLD-MCNC: 120 MG/DL (ref 70–99)
HBA1C MFR BLD: 6.9 % (ref ?–5.7)
HCT VFR BLD AUTO: 39.5 %
HDLC SERPL-MCNC: 33 MG/DL (ref 40–59)
HGB BLD-MCNC: 13.6 G/DL
IMM GRANULOCYTES # BLD AUTO: 0.02 X10(3) UL (ref 0–1)
IMM GRANULOCYTES NFR BLD: 0.3 %
LDLC SERPL CALC-MCNC: 64 MG/DL (ref ?–100)
LYMPHOCYTES # BLD AUTO: 2.33 X10(3) UL (ref 1–4)
LYMPHOCYTES NFR BLD AUTO: 30.9 %
MCH RBC QN AUTO: 32.3 PG (ref 26–34)
MCHC RBC AUTO-ENTMCNC: 34.4 G/DL (ref 31–37)
MCV RBC AUTO: 93.8 FL
MICROALBUMIN UR-MCNC: 0.8 MG/DL
MICROALBUMIN/CREAT 24H UR-RTO: 7.2 UG/MG (ref ?–30)
MONOCYTES # BLD AUTO: 0.67 X10(3) UL (ref 0.1–1)
MONOCYTES NFR BLD AUTO: 8.9 %
NEUTROPHILS # BLD AUTO: 4.05 X10 (3) UL (ref 1.5–7.7)
NEUTROPHILS # BLD AUTO: 4.05 X10(3) UL (ref 1.5–7.7)
NEUTROPHILS NFR BLD AUTO: 53.5 %
NONHDLC SERPL-MCNC: 84 MG/DL (ref ?–130)
OSMOLALITY SERPL CALC.SUM OF ELEC: 295 MOSM/KG (ref 275–295)
PLATELET # BLD AUTO: 261 10(3)UL (ref 150–450)
POTASSIUM SERPL-SCNC: 5.2 MMOL/L (ref 3.5–5.1)
PROT SERPL-MCNC: 7.8 G/DL (ref 5.7–8.2)
RBC # BLD AUTO: 4.21 X10(6)UL
SODIUM SERPL-SCNC: 138 MMOL/L (ref 136–145)
TRIGL SERPL-MCNC: 107 MG/DL (ref 30–149)
TSI SER-ACNC: 1.15 UIU/ML (ref 0.55–4.78)
VLDLC SERPL CALC-MCNC: 16 MG/DL (ref 0–30)
WBC # BLD AUTO: 7.6 X10(3) UL (ref 4–11)

## 2024-11-16 PROCEDURE — 80061 LIPID PANEL: CPT

## 2024-11-16 PROCEDURE — 84443 ASSAY THYROID STIM HORMONE: CPT

## 2024-11-16 PROCEDURE — 85025 COMPLETE CBC W/AUTO DIFF WBC: CPT

## 2024-11-16 PROCEDURE — 82043 UR ALBUMIN QUANTITATIVE: CPT

## 2024-11-16 PROCEDURE — 36415 COLL VENOUS BLD VENIPUNCTURE: CPT

## 2024-11-16 PROCEDURE — 80053 COMPREHEN METABOLIC PANEL: CPT

## 2024-11-16 PROCEDURE — 83036 HEMOGLOBIN GLYCOSYLATED A1C: CPT

## 2024-11-16 PROCEDURE — 82570 ASSAY OF URINE CREATININE: CPT

## 2024-11-22 ENCOUNTER — OFFICE VISIT (OUTPATIENT)
Dept: INTERNAL MEDICINE CLINIC | Facility: CLINIC | Age: 74
End: 2024-11-22

## 2024-11-22 VITALS
BODY MASS INDEX: 32.33 KG/M2 | WEIGHT: 206 LBS | OXYGEN SATURATION: 98 % | DIASTOLIC BLOOD PRESSURE: 62 MMHG | TEMPERATURE: 98 F | HEART RATE: 76 BPM | SYSTOLIC BLOOD PRESSURE: 110 MMHG | HEIGHT: 67 IN

## 2024-11-22 DIAGNOSIS — Z00.00 ENCOUNTER FOR ANNUAL HEALTH EXAMINATION: Primary | Chronic | ICD-10-CM

## 2024-11-22 DIAGNOSIS — N17.9 AKI (ACUTE KIDNEY INJURY) (HCC): ICD-10-CM

## 2024-11-22 DIAGNOSIS — I10 ESSENTIAL HYPERTENSION: ICD-10-CM

## 2024-11-22 DIAGNOSIS — R53.83 FATIGUE, UNSPECIFIED TYPE: ICD-10-CM

## 2024-11-22 DIAGNOSIS — E78.5 DYSLIPIDEMIA: ICD-10-CM

## 2024-11-22 DIAGNOSIS — E11.65 TYPE 2 DIABETES MELLITUS WITH HYPERGLYCEMIA, WITHOUT LONG-TERM CURRENT USE OF INSULIN (HCC): ICD-10-CM

## 2024-11-22 DIAGNOSIS — Z12.5 PROSTATE CANCER SCREENING: ICD-10-CM

## 2024-11-22 RX ORDER — SIMVASTATIN 20 MG
20 TABLET ORAL NIGHTLY
Qty: 90 TABLET | Refills: 3 | Status: SHIPPED | OUTPATIENT
Start: 2024-11-22

## 2024-11-22 RX ORDER — GLIPIZIDE 2.5 MG/1
2.5 TABLET, EXTENDED RELEASE ORAL
Qty: 90 TABLET | Refills: 1 | Status: SHIPPED | OUTPATIENT
Start: 2024-11-22

## 2024-11-22 NOTE — PROGRESS NOTES
HPI:   Isra Reese is a 74 year old male who presents for a Medicare Subsequent Annual Wellness visit (Pt already had Initial Annual Wellness).    He has a past medical history of HTN, type 2 DM, dyslipidemia, psoriasis, osteoarthritis, primary open angle glaucoma.   DM diagnosed 1/2017.   Glaucoma-following with  (11/9/2024)  Had a normal colonoscopy 11/2018.   Calcium score 0      TODAY  Overall feeling well; no major complaints  Denies sob/cp/leg swelling. No urinary retention, hesitancy.       Annual Physical due on 01/26/2018        Patient Care Team: Patient Care Team:  Elyse Rider DO as PCP - General (Internal Medicine)    Patient Active Problem List   Diagnosis    Essential hypertension    Type 2 diabetes mellitus with hyperglycemia, without long-term current use of insulin (HCC)    Dyslipidemia    Age-related nuclear cataract of both eyes    Myopia    Primary open angle glaucoma (POAG) of left eye, mild stage    Type 2 diabetes mellitus without retinopathy (HCC)    Floater, vitreous, bilateral    Myopia of both eyes with astigmatism and presbyopia       Last Cholesterol Labs:   Lab Results   Component Value Date    CHOLEST 117 11/16/2024    HDL 33 (L) 11/16/2024    LDL 64 11/16/2024    TRIG 107 11/16/2024          Last Chemistry Labs:   Lab Results   Component Value Date    AST 15 11/16/2024    ALT 10 11/16/2024    CA 9.7 11/16/2024    ALB 4.5 11/16/2024    TSH 1.150 11/16/2024    CREATSERUM 2.08 (H) 11/16/2024     (H) 11/16/2024        CBC  (most recent labs)   Lab Results   Component Value Date    WBC 7.6 11/16/2024    HGB 13.6 11/16/2024    .0 11/16/2024        ALLERGIES:   He is allergic to penicillins and pollen.    CURRENT MEDICATIONS:   Outpatient Medications Marked as Taking for the 11/22/24 encounter (Office Visit) with Elyse Rider DO   Medication Sig    latanoprost 0.005 % Ophthalmic Solution INSTILL 1 DROP INTO LEFT EYE AT BEDTIME    lisinopril 20 MG Oral Tab Take 1  tablet (20 mg total) by mouth daily.    glipiZIDE ER 2.5 MG Oral Tablet 24 Hr Take 1 tablet (2.5 mg total) by mouth daily with breakfast.    Omeprazole 40 MG Oral Capsule Delayed Release Take 1 capsule (40 mg total) by mouth daily.    VENTOLIN  (90 Base) MCG/ACT Inhalation Aero Soln Inhale 2 puffs into the lungs 3 (three) times daily as needed for Wheezing.    metFORMIN HCl 1000 MG Oral Tab Take 1 tablet (1,000 mg total) by mouth 2 (two) times daily with meals.    simvastatin (ZOCOR) 20 MG Oral Tab Take 1 tablet (20 mg total) by mouth nightly.    aspirin 81 MG Oral Tab EC Take 1 tablet (81 mg total) by mouth daily.    fluocinonide 0.05 % External Ointment Apply 1 g topically 2 (two) times daily.    Microlet Lancets ([employer] LANCETS) Does not apply Misc Test BS daily DX E 11.9 Non insulin dependent    Glucose Blood (CONTOUR NEXT TEST) In Vitro Strip 1 each by Other route daily.      MEDICAL INFORMATION:   He  has a past medical history of Diabetes (HCC), Dyslipidemia, Essential hypertension, Primary open-angle glaucoma, left eye, mild stage (8/23/2017), Psoriasis, and Wears glasses.    He  has a past surgical history that includes colonoscopy (11/2018) and colonoscopy (N/A, 11/14/2018).    His family history includes Cancer in his father; Heart Disorder (age of onset: 88) in his mother; Stroke (age of onset: 51) in his sister.   SOCIAL HISTORY:   He  reports that he has never smoked. He has never used smokeless tobacco. He reports that he does not drink alcohol and does not use drugs.     REVIEW OF SYSTEMS:   GENERAL: feels well otherwise  SKIN:reports psoriasis  EYES: denies blurred vision or double vision  HEENT: denies nasal congestion, sinus pain or ST  LUNGS: denies shortness of breath with exertion  CARDIOVASCULAR: denies chest pain on exertion  GI: denies abdominal pain, denies heartburn  : 0 per night nocturia, no complaint of urinary incontinence      EXAM:   /62   Pulse 76   Temp  97.9 °F (36.6 °C)   Ht 5' 7\" (1.702 m)   Wt 206 lb (93.4 kg)   SpO2 98%   BMI 32.26 kg/m²   Estimated body mass index is 32.26 kg/m² as calculated from the following:    Height as of this encounter: 5' 7\" (1.702 m).    Weight as of this encounter: 206 lb (93.4 kg).    Medicare Hearing Assessment  (Required for AWV/SWV)    Hearing Screening    Screening Method:  Questionnaire  I have a problem hearing over the telephone:  No    I have trouble understanding things on the TV:  No I have to strain to understand conversations:  No   I have to worry about missing the telephone ring or doorbell:  No I have trouble hearing conversations in a noisy background such as a crowded room or restaurant:  No   I get confused about where sounds come from:  No I misunderstand some words in a sentence and need to ask people to repeat themselves:  No   I especially have trouble understanding the speech of women and children:  No I have trouble understanding the speaker in a large room such as at a meeting or place of Gnosticist:  No   Many people I talk to seem to mumble (or don't speak clearly):  No People get annoyed because I misunderstand what they say:  No   I misunderstand what others are saying and make inappropriate responses:  No I avoid social activities because I cannot hear well and fear I will reply improperly:  No   Family members and friends have told me they think I may have hearing loss:  No               Visual Acuity  Right Eye Visual Acuity: Corrected Right Eye Chart Acuity: 20/20   Left Eye Visual Acuity: Corrected Left Eye Chart Acuity: 20/20   Both Eyes Visual Acuity: Corrected Both Eyes Chart Acuity: 20/20   Able To Tolerate Visual Acuity: Yes      General Appearance:  Alert, cooperative, no distress, appears stated age   Head:  Normocephalic, without obvious abnormality, atraumatic   Eyes:  PERRL, conjunctiva/corneas clear, EOM's intact, both eyes   Ears:  Normal TM's and external ear canals, both ears   Nose:  Nares normal, septum midline, mucosa normal, no drainage or sinus tenderness   Throat: Lips, mucosa, and tongue normal; teeth and gums normal   Neck: Supple, symmetrical, trachea midline, no adenopathy, thyroid: not enlarged, symmetric, no tenderness/mass/nodules, no carotid bruit or JVD       Lungs:   Clear to auscultation bilaterally, respirations unlabored       Heart:  Regular rate and rhythm, S1, S2 normal, no murmur, rub or gallop   Abdomen:   Soft, non-tender, bowel sounds active all four quadrants,  no masses, no organomegaly           Extremities: Extremities normal, atraumatic, no cyanosis or edema   Pulses: 2+ and symmetric   Skin: Skin color, texture, turgor normal, no rashes or lesions   Lymph nodes: Cervical, supraclavicular, and axillary nodes normal   Neurologic: Normal              SUGGESTED VACCINATIONS - Influenza, Pneumococcal, Zoster, Tetanus     Immunization History   Administered Date(s) Administered    Pneumovax 23 01/26/2017    TDAP 02/23/2017       ASSESSMENT AND OTHER RELEVANT CHRONIC CONDITIONS:   Isra Reese is a 74 year old male who presents for a Medicare Assessment.     PLAN SUMMARY:   There are no diagnoses linked to this encounter.       Diet assessment: excellent     Advanced Directive:  Living Will on file in Epic?  Isra Reese does not have a Living Will on file in Epic. Not Discussed       Healthcare Power of  on file in Epic:    Isra Reese does not have a Power of  for Health Care on file in [facility]. Not Discussed           PLAN:  The patient indicates understanding of these issues and agrees to the plan.    1.  type 2 diabetes mellitus (HCC)  A1c 6.9  Metformin 500mg bid  Glipizide 2.5mg daily    2. Dyslipidemia  aspirin 81mg daily, zocor 20mg daily.    3. Hypertension  Continue lisinopril 20mg daily    4. psoriasis  Lidex prn. Asked him to consider dermatology evaluation.     5. Annual physical  UTD on tdap and pneumovax; had normal colonoscopy with   Chris 11/2018 (repeat due in 10 years)    6. Acute kidney injury  Was advised last visit to see nephrologist-pt was confused; I reiterated need this time to see kidney specialist as Cr is 2 now; pt has had good bp and diabetes control; US done 11/2023 without evidence of obstruction  Pt provided contact information and urged to call and make appt today    Follow up in 6 months.     No follow-ups on file.             General Health     In the past six months, have you lost more than 10 pounds without trying?: 2 - No    Has your appetite been poor?: No    How does the patient maintain a good energy level?: Daily Walks;Other    How would you describe your daily physical activity?: Moderate    How would you describe your current health state?: Fair    How do you maintain positive mental well-being?: Social Interaction;Puzzles         Have you had any immunizations at another office such as Influenza, Hepatitis B, Tetanus, or Pneumococcal?: Yes     Functional Ability     Bathing or Showering: Able without help    Toileting: Able without help    Dressing: Able without help    Eating: Able without help    Driving: Able without help    Preparing your meals: Able without help    Managing money/bills: Able without help    Taking medications as prescribed: Able without help    Are you able to afford your medications?: Yes    Hearing Problems?: No     Functional Status     Hearing Problems?: No    Vision Problems? : Yes    Difficulty walking?: No    Difficulty dressing or bathing?: No    Problems with daily activities? : No    Memory Problems?: No      Fall/Risk Assessment                                                              Depression Screening (PHQ-2/PHQ-9): Over the LAST 2 WEEKS   Little interest or pleasure in doing things (over the last two weeks)?: Not at all    Feeling down, depressed, or hopeless (over the last two weeks)?: Not at all    PHQ-2 SCORE: 0         Advance Directives     Do you have a healthcare  power of ?: No    Do you have a living will?: No     Please go to \"Cognitive Assessment\" under Medicare Assessment section in Charting, test patient and document.    Then, refresh your progress note to see your input here.  Cognitive Assessment     What day of the week is this?: Correct    What month is it?: Correct    What year is it?: Correct    Recall \"Ball\": Correct    Recall \"Flag\": Correct    Recall \"Tree\": Correct       This section provided for quick review of chart, separate sheet to patient  PREVENTATIVE SERVICES  INDICATIONS AND SCHEDULE Internal Lab or Procedure External Lab or Procedure   Diabetes Screening      HbgA1C   Annually HgbA1C (%)   Date Value   11/16/2024 6.9 (H)            No data to display                Fasting Blood Sugar (FSB)Annually Glucose (mg/dL)   Date Value   11/16/2024 120 (H)          Cardiovascular Disease Screening     LDL Annually LDL Cholesterol (mg/dL)   Date Value   11/16/2024 64        EKG - w/ Initial Preventative Physical Exam only, or if medically necessary Electrocardiogram date01/26/2017    Colorectal Cancer Screening      Colonoscopy Screen every 10 years Health Maintenance   Topic Date Due    Colorectal Cancer Screening  11/14/2028    Update Health Maintenance if applicable    Flex Sigmoidoscopy Screen every 10 years No results found for this or any previous visit.      No data to display                 Fecal Occult Blood Annually No results found for: \"FOB\"      No data to display                Glaucoma Screening      Ophthalmology Visit Annually: Diabetics, FHx Glaucoma, AA>50, > 65     9/10/2022     8:19 AM   Data entered on:   Last Dilated Eye Exam 9/10/2022       Prostate Cancer Screening      PSA  Annually Health Maintenance   Topic Date Due    PSA  09/30/2025     Update Health Maintenance if applicable   Immunizations      Influenza Orders placed or performed in visit on 10/06/23    High Dose Fluzone 65 yr and older PFS [65686]   Orders  placed or performed in visit on 10/17/22    FLU VACC HIGH DOSE PRSV FREE   Orders placed or performed in visit on 09/16/21    Fluzone High Dose 65 yr and older PFS [22935]   Orders placed or performed in visit on 09/23/20    FLU VACC HIGH DOSE PRSV FREE   Orders placed or performed in visit on 10/01/19    Fluzone High Dose  65 years and older [13339]    Update Immunization Activity if applicable    Pneumoccocal 13 (Prevnar) Orders placed or performed in visit on 07/02/18    PNEUMOCOCCAL VACC, 13 MARILUZ IM         Pneumococcal 23 (Pneumovax) Orders placed or performed in visit on 01/26/17    PNEUMOCOCCAL IMM, 23        Hepatitis B No orders found for this or any previous visit.     Tetanus Orders placed or performed in visit on 02/23/17    tdap            SPECIFIC DISEASE MONITORING Internal Lab or Procedure External Lab or Procedure   Annual Monitoring of Persistent     Medications (ACE/ARB, digoxin diuretics, anticonvulsants.)    Potassium  Annually Potassium (mmol/L)   Date Value   11/16/2024 5.2 (H)         No data to display                Creatinine  Annually Creatinine (mg/dL)   Date Value   11/16/2024 2.08 (H)         No data to display                Drug Serum Conc  Annually No results found for: \"DIGOXIN\", \"DIG\", \"VALP\"      No data to display                Diabetes      HgbA1C  Annually HgbA1C (%)   Date Value   11/16/2024 6.9 (H)            No data to display                Creat/alb ratio  Annually      LDL  Annually LDL Cholesterol (mg/dL)   Date Value   11/16/2024 64         No data to display                 Dilated Eye exam  Annually     9/10/2022     8:19 AM   Data entered on:   Last Dilated Eye Exam 9/10/2022          No data to display                COPD      Spirometry Testing Annually Spirometry date:       No data to display

## 2025-03-27 DIAGNOSIS — K29.00 ACUTE GASTRITIS, PRESENCE OF BLEEDING UNSPECIFIED, UNSPECIFIED GASTRITIS TYPE: ICD-10-CM

## 2025-03-27 RX ORDER — OMEPRAZOLE 40 MG/1
40 CAPSULE, DELAYED RELEASE ORAL DAILY
Qty: 90 CAPSULE | Refills: 3 | Status: SHIPPED | OUTPATIENT
Start: 2025-03-27

## 2025-03-27 NOTE — TELEPHONE ENCOUNTER
Refill request is for a maintenance medication and has met the criteria specified in the Ambulatory Medication Refill Standing Order for eligibility, visits, laboratory, alerts and was sent to the requested pharmacy.    Requested Prescriptions     Signed Prescriptions Disp Refills    Omeprazole 40 MG Oral Capsule Delayed Release 90 capsule 3     Sig: Take 1 capsule by mouth once daily     Authorizing Provider: CRISTIANE MULLEN     Ordering User: JAVED VARGAS

## 2025-05-16 ENCOUNTER — OFFICE VISIT (OUTPATIENT)
Dept: INTERNAL MEDICINE CLINIC | Facility: CLINIC | Age: 75
End: 2025-05-16
Payer: MEDICARE

## 2025-05-16 ENCOUNTER — OFFICE VISIT (OUTPATIENT)
Dept: DERMATOLOGY CLINIC | Facility: CLINIC | Age: 75
End: 2025-05-16

## 2025-05-16 VITALS
SYSTOLIC BLOOD PRESSURE: 124 MMHG | OXYGEN SATURATION: 98 % | TEMPERATURE: 98 F | DIASTOLIC BLOOD PRESSURE: 72 MMHG | HEIGHT: 67 IN | HEART RATE: 99 BPM | BODY MASS INDEX: 32.96 KG/M2 | WEIGHT: 210 LBS

## 2025-05-16 DIAGNOSIS — E78.5 DYSLIPIDEMIA: ICD-10-CM

## 2025-05-16 DIAGNOSIS — I10 ESSENTIAL HYPERTENSION: ICD-10-CM

## 2025-05-16 DIAGNOSIS — E11.65 TYPE 2 DIABETES MELLITUS WITH HYPERGLYCEMIA, WITHOUT LONG-TERM CURRENT USE OF INSULIN (HCC): Primary | ICD-10-CM

## 2025-05-16 DIAGNOSIS — L40.9 PSORIASIS: Primary | ICD-10-CM

## 2025-05-16 PROCEDURE — 99214 OFFICE O/P EST MOD 30 MIN: CPT | Performed by: INTERNAL MEDICINE

## 2025-05-16 PROCEDURE — 99203 OFFICE O/P NEW LOW 30 MIN: CPT | Performed by: PHYSICIAN ASSISTANT

## 2025-05-16 PROCEDURE — G2211 COMPLEX E/M VISIT ADD ON: HCPCS | Performed by: INTERNAL MEDICINE

## 2025-05-16 RX ORDER — CLOBETASOL PROPIONATE 0.5 MG/G
1 OINTMENT TOPICAL DAILY PRN
Qty: 30 G | Refills: 2 | Status: SHIPPED | OUTPATIENT
Start: 2025-05-16

## 2025-05-16 RX ORDER — MUPIROCIN 20 MG/G
1 OINTMENT TOPICAL 3 TIMES DAILY
Qty: 30 G | Refills: 0 | Status: SHIPPED | OUTPATIENT
Start: 2025-05-16

## 2025-05-16 RX ORDER — CALCIPOTRIENE 50 UG/G
1 OINTMENT TOPICAL 2 TIMES DAILY
Qty: 120 G | Refills: 1 | Status: SHIPPED | OUTPATIENT
Start: 2025-05-16

## 2025-05-16 NOTE — PROGRESS NOTES
Isra Reese is a 74 year old male.  Chief Complaint   Patient presents with    Checkup     6 month        HPI:   Isra Reese is a 74 year old male who presents for: 6 month follow up    Overall feeling well  New rash on legs, very itchy    Wt Readings from Last 6 Encounters:   05/16/25 210 lb (95.3 kg)   11/22/24 206 lb (93.4 kg)   07/15/24 208 lb (94.3 kg)   05/31/24 209 lb (94.8 kg)   01/22/24 191 lb (86.6 kg)   11/15/23 208 lb (94.3 kg)     Body mass index is 32.89 kg/m².       Current Medications[1]   Past Medical History[2]   Past Surgical History[3]   Family History[4]   Social History:   Short Social Hx on File[5]       REVIEW OF SYSTEMS:   GENERAL: feels well otherwise  SKIN: d+rash  HEENT: denies nasal congestion, sinus pain, ST, sore throat, ear pain  LUNGS: denies shortness of breath with exertion, wheezing, cough, or sputum production  CARDIOVASCULAR: denies chest pain, pressure, or palpitations      EXAM:   /72   Pulse 99   Temp 98.4 °F (36.9 °C)   Ht 5' 7\" (1.702 m)   Wt 210 lb (95.3 kg)   SpO2 98%   BMI 32.89 kg/m²     GENERAL: well developed, well nourished, in no apparent distress  NECK: supple, no carotic bruits, no thyromegaly, no cervical or supraclavicular LAD  LUNGS: clear to auscultation bilaterally, no wheezing or rhonchi  CARDIO: RRR, normal S1S2, no gallops or murmurs      ASSESSMENT AND PLAN:           1.  type 2 diabetes mellitus (HCC)  A1c 6.9  Metformin 500mg bid  Glipizide 2.5mg daily    2. Dyslipidemia  aspirin 81mg daily, zocor 20mg daily.    3. Hypertension  Continue lisinopril 20mg daily    4. psoriasis  Lidex prn. Asked him to consider dermatology evaluation.     5. Annual physical  UTD on tdap and pneumovax; had normal colonoscopy with Dr. Perez 11/2018 (repeat due in 10 years)    6. Rash  Was able to obtain dermatology appt for today    -labs due soon--will return when fasting     Elyse Rider DO  5/16/2025  1:17 PM    Follow up in 6 months.          [1]    Current Outpatient Medications   Medication Sig Dispense Refill    Omeprazole 40 MG Oral Capsule Delayed Release Take 1 capsule by mouth once daily 90 capsule 3    metFORMIN HCl 500 MG Oral Tab Take 1 tablet (500 mg total) by mouth 2 (two) times daily with meals. 180 tablet 3    glipiZIDE ER 2.5 MG Oral Tablet 24 Hr Take 1 tablet (2.5 mg total) by mouth daily with breakfast. 90 tablet 1    simvastatin (ZOCOR) 20 MG Oral Tab Take 1 tablet (20 mg total) by mouth nightly. 90 tablet 3    latanoprost 0.005 % Ophthalmic Solution INSTILL 1 DROP INTO LEFT EYE AT BEDTIME 3 each 3    lisinopril 20 MG Oral Tab Take 1 tablet (20 mg total) by mouth daily. 90 tablet 3    VENTOLIN  (90 Base) MCG/ACT Inhalation Aero Soln Inhale 2 puffs into the lungs 3 (three) times daily as needed for Wheezing. 1 each 11    aspirin 81 MG Oral Tab EC Take 1 tablet (81 mg total) by mouth daily. 90 tablet 3    fluocinonide 0.05 % External Ointment Apply 1 g topically 2 (two) times daily. (Patient taking differently: Apply 1 g topically 2 (two) times daily as needed.) 60 g 3    Microlet Lancets ([employer] MICROLET LANCETS) Does not apply Misc Test BS daily DX E 11.9 Non insulin dependent 100 each 11    Glucose Blood (CONTOUR NEXT TEST) In Vitro Strip 1 each by Other route daily. 100 each 11   [2]   Past Medical History:   Diabetes (HCC)    Dyslipidemia    Essential hypertension    Primary open-angle glaucoma, left eye, mild stage    8//23/17 Started on Latanoprost qhs OS by Dr. Ch due to thinning of the optic nerve OS on OCT     Psoriasis    Wears glasses   [3]   Past Surgical History:  Procedure Laterality Date    Colonoscopy  11/2018    Colonoscopy N/A 11/14/2018    Procedure: COLONOSCOPY, POSSIBLE BIOPSY, POSSIBLE POLYPECTOMY 40538;  Surgeon: Stephan Perez MD;  Location: [facility] [place] [facility] [place]   [4]   Family History  Problem Relation Age of Onset    Cancer Father         unknown    Heart Disorder Mother 88        heart attack     Stroke Sister 51    Diabetes Neg     Glaucoma Neg     Macular degeneration Neg    [5]   Social History  Socioeconomic History    Marital status:    Tobacco Use    Smoking status: Never    Smokeless tobacco: Never   Vaping Use    Vaping status: Never Used   Substance and Sexual Activity    Alcohol use: No    Drug use: No

## 2025-05-16 NOTE — PROGRESS NOTES
HPI:    Patient ID: Isra Reese is a 74 year old male.    Patient presents for psoraisis on the arms, legs and back. No draining or tenderness noted. No joint pain. No nail changes. Has had psoriasis since he was 21 years old. Sister has psoriasis as well. Hx of allergies to medications noted. Was prescribed fluocinonide in the past with some relief. No personal or family hx of skin cancer or skin disease. Very itchy. Keeps him up at night. Stressed at work.         Review of Systems   Constitutional:  Negative for chills and fever.   Musculoskeletal:  Negative for arthralgias and myalgias.   Skin:  Positive for rash. Negative for color change and wound.          Current Medications[1]  Allergies:Allergies[2]   There were no vitals taken for this visit.  There is no height or weight on file to calculate BMI.  PHYSICAL EXAM:   Physical Exam  Constitutional:       General: He is not in acute distress.     Appearance: Normal appearance.   Skin:     General: Skin is warm and dry.      Findings: Rash present.      Comments: Psoriasis plaques noted on the lower legs. No draining or tenderness noted. No scaling noted. No erythema noted. Also on the arms bilaterally.    Neurological:      Mental Status: He is alert and oriented to person, place, and time.                ASSESSMENT/PLAN:   1. Psoriasis  -After discussion with patient, advised the following:  -Start clobetasol ointment  -Educated to apply 2 times per day on affected areas   -Start calcipotriene  -Educated to apply 2 tiems per day on affected areas  -Start mupirocin  -Educated to apply 2 times per day on bleeding areas.   -Return in 2-3 weeks if not improving.   -To call or follow-up with worsening symptoms or concerns  -Patient was agreeable to plan and will comply with discussion above.         No orders of the defined types were placed in this encounter.      Meds This Visit:  Requested Prescriptions     Signed Prescriptions Disp Refills    clobetasol  0.05 % External Ointment 30 g 2     Sig: Apply 1 Application topically daily as needed.    Calcipotriene 0.005 % External Ointment 120 g 1     Sig: Apply 1 Application topically 2 (two) times daily.    mupirocin 2 % External Ointment 30 g 0     Sig: Apply 1 Application topically 3 (three) times daily.       Imaging & Referrals:  None         ID#[id number]       [1]   Current Outpatient Medications   Medication Sig Dispense Refill    clobetasol 0.05 % External Ointment Apply 1 Application topically daily as needed. 30 g 2    Calcipotriene 0.005 % External Ointment Apply 1 Application topically 2 (two) times daily. 120 g 1    mupirocin 2 % External Ointment Apply 1 Application topically 3 (three) times daily. 30 g 0    Omeprazole 40 MG Oral Capsule Delayed Release Take 1 capsule by mouth once daily 90 capsule 3    metFORMIN HCl 500 MG Oral Tab Take 1 tablet (500 mg total) by mouth 2 (two) times daily with meals. 180 tablet 3    glipiZIDE ER 2.5 MG Oral Tablet 24 Hr Take 1 tablet (2.5 mg total) by mouth daily with breakfast. 90 tablet 1    simvastatin (ZOCOR) 20 MG Oral Tab Take 1 tablet (20 mg total) by mouth nightly. 90 tablet 3    latanoprost 0.005 % Ophthalmic Solution INSTILL 1 DROP INTO LEFT EYE AT BEDTIME 3 each 3    lisinopril 20 MG Oral Tab Take 1 tablet (20 mg total) by mouth daily. 90 tablet 3    VENTOLIN  (90 Base) MCG/ACT Inhalation Aero Soln Inhale 2 puffs into the lungs 3 (three) times daily as needed for Wheezing. 1 each 11    aspirin 81 MG Oral Tab EC Take 1 tablet (81 mg total) by mouth daily. 90 tablet 3    fluocinonide 0.05 % External Ointment Apply 1 g topically 2 (two) times daily. (Patient taking differently: Apply 1 g topically 2 (two) times daily as needed.) 60 g 3    Microlet Lancets ([employer] MICROLET LANCETS) Does not apply Misc Test BS daily DX E 11.9 Non insulin dependent 100 each 11    Glucose Blood (CONTOUR NEXT TEST) In Vitro Strip 1 each by Other route daily. 100 each 11   [2]    Allergies  Allergen Reactions    Penicillins HIVES    Pollen ITCHING

## 2025-05-21 RX ORDER — SIMVASTATIN 20 MG
20 TABLET ORAL NIGHTLY
Qty: 90 TABLET | Refills: 3 | Status: SHIPPED | OUTPATIENT
Start: 2025-05-21

## 2025-05-21 RX ORDER — GLIPIZIDE 2.5 MG/1
2.5 TABLET, EXTENDED RELEASE ORAL
Qty: 90 TABLET | Refills: 3 | Status: SHIPPED | OUTPATIENT
Start: 2025-05-21

## 2025-05-23 ENCOUNTER — PATIENT MESSAGE (OUTPATIENT)
Dept: DERMATOLOGY CLINIC | Facility: CLINIC | Age: 75
End: 2025-05-23

## 2025-05-23 RX ORDER — CALCIPOTRIENE 50 UG/G
1 OINTMENT TOPICAL 2 TIMES DAILY
Qty: 120 G | Refills: 1 | Status: SHIPPED | OUTPATIENT
Start: 2025-05-23

## 2025-06-17 RX ORDER — LISINOPRIL 20 MG/1
20 TABLET ORAL DAILY
Qty: 90 TABLET | Refills: 3 | Status: SHIPPED | OUTPATIENT
Start: 2025-06-17

## 2025-06-17 NOTE — TELEPHONE ENCOUNTER
Refill request is for a maintenance medication and has met the criteria specified in the Ambulatory Medication Refill Standing Order for eligibility, visits, laboratory, alerts and was sent to the requested pharmacy.    Requested Prescriptions     Signed Prescriptions Disp Refills    lisinopril 20 MG Oral Tab 90 tablet 3     Sig: Take 1 tablet (20 mg total) by mouth daily.     Authorizing Provider: CRISTIANE MULLEN     Ordering User: DINA MAGUIRE

## 2025-06-23 RX ORDER — GLIPIZIDE 2.5 MG/1
2.5 TABLET, EXTENDED RELEASE ORAL
Qty: 90 TABLET | Refills: 3 | OUTPATIENT
Start: 2025-06-23

## 2025-06-23 NOTE — TELEPHONE ENCOUNTER
Current refill request refused due to refill is either a duplicate request or has active refills at the pharmacy.  Check previous templates.    Requested Prescriptions     Refused Prescriptions Disp Refills    glipiZIDE ER 2.5 MG Oral Tablet 24 Hr 90 tablet 3     Sig: Take 1 tablet (2.5 mg total) by mouth daily with breakfast.     Refused By: DINA MAGUIRE     Reason for Refusal: Duplicate refill request      Sent 5/21/2025 with enough refills for one year.

## 2025-06-24 ENCOUNTER — PATIENT MESSAGE (OUTPATIENT)
Dept: INTERNAL MEDICINE CLINIC | Facility: CLINIC | Age: 75
End: 2025-06-24

## 2025-06-28 ENCOUNTER — LAB ENCOUNTER (OUTPATIENT)
Dept: LAB | Age: 75
End: 2025-06-28
Attending: INTERNAL MEDICINE
Payer: MEDICARE

## 2025-06-28 DIAGNOSIS — N17.9 AKI (ACUTE KIDNEY INJURY): ICD-10-CM

## 2025-06-28 DIAGNOSIS — E11.65 TYPE 2 DIABETES MELLITUS WITH HYPERGLYCEMIA, WITHOUT LONG-TERM CURRENT USE OF INSULIN (HCC): ICD-10-CM

## 2025-06-28 DIAGNOSIS — Z12.5 PROSTATE CANCER SCREENING: ICD-10-CM

## 2025-06-28 DIAGNOSIS — R53.83 FATIGUE, UNSPECIFIED TYPE: ICD-10-CM

## 2025-06-28 DIAGNOSIS — I10 ESSENTIAL HYPERTENSION: ICD-10-CM

## 2025-06-28 LAB
ALBUMIN SERPL-MCNC: 4.7 G/DL (ref 3.2–4.8)
ALBUMIN/GLOB SERPL: 1.6 {RATIO} (ref 1–2)
ALP LIVER SERPL-CCNC: 83 U/L (ref 45–117)
ALT SERPL-CCNC: 8 U/L (ref 10–49)
ANION GAP SERPL CALC-SCNC: 7 MMOL/L (ref 0–18)
AST SERPL-CCNC: 14 U/L (ref ?–34)
BASOPHILS # BLD AUTO: 0.09 X10(3) UL (ref 0–0.2)
BASOPHILS NFR BLD AUTO: 1.4 %
BILIRUB SERPL-MCNC: 0.4 MG/DL (ref 0.2–1.1)
BUN BLD-MCNC: 31 MG/DL (ref 9–23)
BUN/CREAT SERPL: 14.3 (ref 10–20)
CALCIUM BLD-MCNC: 9.3 MG/DL (ref 8.7–10.4)
CHLORIDE SERPL-SCNC: 107 MMOL/L (ref 98–112)
CHOLEST SERPL-MCNC: 117 MG/DL (ref ?–200)
CO2 SERPL-SCNC: 20 MMOL/L (ref 21–32)
COMPLEXED PSA SERPL-MCNC: 1 NG/ML (ref ?–4)
CREAT BLD-MCNC: 2.17 MG/DL (ref 0.7–1.3)
DEPRECATED RDW RBC AUTO: 50.1 FL (ref 35.1–46.3)
EGFRCR SERPLBLD CKD-EPI 2021: 31 ML/MIN/1.73M2 (ref 60–?)
EOSINOPHIL # BLD AUTO: 0.45 X10(3) UL (ref 0–0.7)
EOSINOPHIL NFR BLD AUTO: 6.8 %
ERYTHROCYTE [DISTWIDTH] IN BLOOD BY AUTOMATED COUNT: 14.3 % (ref 11–15)
EST. AVERAGE GLUCOSE BLD GHB EST-MCNC: 143 MG/DL (ref 68–126)
FASTING PATIENT LIPID ANSWER: YES
FASTING STATUS PATIENT QL REPORTED: YES
GLOBULIN PLAS-MCNC: 3 G/DL (ref 2–3.5)
GLUCOSE BLD-MCNC: 110 MG/DL (ref 70–99)
HBA1C MFR BLD: 6.6 % (ref ?–5.7)
HCT VFR BLD AUTO: 39.1 % (ref 39–53)
HDLC SERPL-MCNC: 33 MG/DL (ref 40–59)
HGB BLD-MCNC: 12.5 G/DL (ref 13–17.5)
IMM GRANULOCYTES # BLD AUTO: 0.03 X10(3) UL (ref 0–1)
IMM GRANULOCYTES NFR BLD: 0.5 %
LDLC SERPL CALC-MCNC: 67 MG/DL (ref ?–100)
LYMPHOCYTES # BLD AUTO: 1.9 X10(3) UL (ref 1–4)
LYMPHOCYTES NFR BLD AUTO: 28.9 %
MCH RBC QN AUTO: 30.7 PG (ref 26–34)
MCHC RBC AUTO-ENTMCNC: 32 G/DL (ref 31–37)
MCV RBC AUTO: 96.1 FL (ref 80–100)
MONOCYTES # BLD AUTO: 0.72 X10(3) UL (ref 0.1–1)
MONOCYTES NFR BLD AUTO: 11 %
NEUTROPHILS # BLD AUTO: 3.38 X10 (3) UL (ref 1.5–7.7)
NEUTROPHILS # BLD AUTO: 3.38 X10(3) UL (ref 1.5–7.7)
NEUTROPHILS NFR BLD AUTO: 51.4 %
NONHDLC SERPL-MCNC: 84 MG/DL (ref ?–130)
OSMOLALITY SERPL CALC.SUM OF ELEC: 285 MOSM/KG (ref 275–295)
PLATELET # BLD AUTO: 289 10(3)UL (ref 150–450)
POTASSIUM SERPL-SCNC: 5.6 MMOL/L (ref 3.5–5.1)
PROT SERPL-MCNC: 7.7 G/DL (ref 5.7–8.2)
RBC # BLD AUTO: 4.07 X10(6)UL (ref 3.8–5.8)
SODIUM SERPL-SCNC: 134 MMOL/L (ref 136–145)
TRIGL SERPL-MCNC: 86 MG/DL (ref 30–149)
VLDLC SERPL CALC-MCNC: 13 MG/DL (ref 0–30)
WBC # BLD AUTO: 6.6 X10(3) UL (ref 4–11)

## 2025-06-28 PROCEDURE — 80053 COMPREHEN METABOLIC PANEL: CPT

## 2025-06-28 PROCEDURE — 36415 COLL VENOUS BLD VENIPUNCTURE: CPT

## 2025-06-28 PROCEDURE — 83036 HEMOGLOBIN GLYCOSYLATED A1C: CPT

## 2025-06-28 PROCEDURE — 80061 LIPID PANEL: CPT

## 2025-06-28 PROCEDURE — 85025 COMPLETE CBC W/AUTO DIFF WBC: CPT

## 2025-06-30 ENCOUNTER — TELEPHONE (OUTPATIENT)
Dept: INTERNAL MEDICINE CLINIC | Facility: CLINIC | Age: 75
End: 2025-06-30

## 2025-06-30 DIAGNOSIS — D64.9 ANEMIA, UNSPECIFIED TYPE: Primary | ICD-10-CM

## 2025-06-30 NOTE — TELEPHONE ENCOUNTER
Please notify pt that his blood work overall is ok--    1) diabetes control is good, and I would have him stop the glipizide.   2) his kidney function is worse and it is imperative that he see the kidney doctor for evaluation; dr. Whitley, Krystle, Horace Marie, and Gary--all great. Please call [phone] today to schedule an appointment    3) his blood count is slightly lower; it may be related to his kidneys, but I would have him go for additional tests to look at iron/b12/folic acid to see if he has any deficiencies

## (undated) NOTE — LETTER
9/23/2019            Porsche Reese        [address] [facility] [address]       Dear Chace Messina,    The visual field test you took on 9/21/2019 indicated normal field of vision in both eyes.   An optic nerve analysis showed normal

## (undated) NOTE — LETTER
September 12, 2020    Deonte Jacobs DO  [address] [address]     Patient: Sanjuanita Dietrich   YOB: 1950   Date of Visit: 9/12/2020       Dear Dr. Daina Goodson DO:    Thank you for referring Roly Maldonado to me for evaluation.  Here i DAILY 100 each 11   • metFORMIN HCl 500 MG Oral Tab Take 1 tablet (500 mg total) by mouth 2 (two) times daily.  180 tablet 3   • latanoprost 0.005 % Ophthalmic Solution INSTILL 1 DROP INTO LEFT EYE AT BEDTIME 3 Bottle 3   • Quinapril HCl (ACCUPRIL) 20 MG Or Both eyes:  1.0% Mydriacyl and 2.5% Audie Synephrine @ 8:04 AM            Slit Lamp and Fundus Exam     External Exam       Right Left    External Normal Normal          Slit Lamp Exam       Right Left    Lids/Lashes Dermatochalasis, Meibomian gland dysfunc Fundus Photos - OU - Both Eyes      Meds This Visit:  Requested Prescriptions      No prescriptions requested or ordered in this encounter        Follow up instructions:  Return for Next avail. VF and OCT with no MD and then 4 mos for an IOP .     9/12/

## (undated) NOTE — LETTER
AUTHORIZATION FOR SURGICAL OPERATION OR OTHER PROCEDURE    1. I hereby authorize Edil Velasco, and [facility] staff assigned to my case to perform the following operation and/or procedure at the [facility] site:    _______________________________________________________________________________________________    Left knee cortisone injection   _______________________________________________________________________________________________    2.  My physician has explained the nature and purpose of the operation or other procedure, possible alternative methods of treatment, the risks involved, and the possibility of complication to me.  I acknowledge that no guarantee has been made as to the result that may be obtained.  3.  I recognize that, during the course of this operation, or other procedure, unforseen conditions may necessitate additional or different procedure than those listed above.  I, therefore, further authorize and request that the above named physician, his/her physician assistants or designees perform such procedures as are, in his/her professional opinion, necessary and desirable.  4.  Any tissue or organs removed in the operation or other procedure may be disposed of by and at the discretion of the [facility].  5.  I understand that in the event of a medical emergency, I will be transported by local paramedics to [facility] or other hospital emergency department.  6.  I certify that I have read and fully understand the above consent to operation and/or other procedure.    7.  I acknowledge that my physician has explained sedation/analgesia administration to me including the risks and benefits.  I consent to the administration of sedation/analgesia as may be necessary or desirable in the judgement of my physician.    Witness signature: ___________________________________________________ Date:   ______/______/_____                    Time:  ________ A.M.  P.M.       Patient Name:  ______________________________________________________  (please print)      Patient signature:  ___________________________________________________             Relationship to Patient:           []  Parent    Responsible person                          []  Spouse  In case of minor or                    [] Other  _____________   Incompetent name:  __________________________________________________                               (please print)      _____________      Responsible person  In case of minor or  Incompetent signature:  _______________________________________________    Statement of Physician  My signature below affirms that prior to the time of the procedure, I have explained to the patient and/or his/her guardian, the risks and benefits involved in the proposed treatment and any reasonable alternative to the proposed treatment.  I have also explained the risks and benefits involved in the refusal of the proposed treatment and have answered the patient's questions.                        Date:  ______/______/_______  Provider                      Signature:  __________________________________________________________       Time:  ___________ A.M    P.M.

## (undated) NOTE — LETTER
September 9, 2023      No Recipients     Patient: Popeye Lloyd   YOB: 1950   Date of Visit: 9/9/2023       Dear Dr. Phoebe Ponce Recipients: Thank you for referring Marah Mejia to me for evaluation. Here is my assessment and plan of care:    Popeye Lloyd is a 68year old male. HPI:     HPI    Pt in today for a diabetic eye exam. Per pt is taking Latanoprost in the left eye at bedtime as directed. Pt states vision is stable (not very good but is not sure about cataract surgery) and denies any ocular issues. Pt has been a diabetic for 5+ years       Pt's diabetes is currently controlled by pills   Pt checks BS 2x a day    Pt's last blood sugar was 110 this morning   Last HA1C was 7.5 on 3/25/23  Endocrinologist: none  Last edited by Gunnar Oconnell OT on 9/9/2023  8:05 AM.        Patient History:  Past Medical History:   Diagnosis Date    Diabetes ([facility] [address]     Dyslipidemia     Essential hypertension     Primary open-angle glaucoma, left eye, mild stage 8/23/2017 8//23/17 Started on Latanoprost qhs OS by Dr. Wendie Kahn due to thinning of the optic nerve OS on OCT     Psoriasis     Wears glasses        Surgical History: Popeye Lloyd has a past surgical history that includes colonoscopy (11/2018) and colonoscopy (N/A, 11/14/2018) (Procedure: COLONOSCOPY, POSSIBLE BIOPSY, POSSIBLE POLYPECTOMY 98097;  Surgeon: Mirta Martinez MD;  Location: [address] [place] [address]).     Family History   Problem Relation Age of Onset    Cancer Father         unknown    Heart Disorder Mother 80        heart attack    Stroke Sister 46    Diabetes Neg     Glaucoma Neg     Macular degeneration Neg        Social History:   Social History     Socioeconomic History    Marital status:    Tobacco Use    Smoking status: Never    Smokeless tobacco: Never   Vaping Use    Vaping Use: Never used   Substance and Sexual Activity    Alcohol use: No    Drug use: No       Medications:  Current Outpatient Medications Medication Sig Dispense Refill    latanoprost 0.005 % Ophthalmic Solution INSTILL 1 DROP INTO LEFT EYE AT BEDTIME 3 each 3    lisinopril 20 MG Oral Tab Take 1 tablet (20 mg total) by mouth daily. 90 tablet 3    metFORMIN HCl 1000 MG Oral Tab Take 1 tablet (1,000 mg total) by mouth 2 (two) times daily with meals. 180 tablet 3    simvastatin (ZOCOR) 20 MG Oral Tab Take 1 tablet (20 mg total) by mouth nightly. 90 tablet 3    aspirin 81 MG Oral Tab EC Take 1 tablet (81 mg total) by mouth daily. 90 tablet 3    fluocinonide 0.05 % External Ointment Apply 1 g topically 2 (two) times daily. 60 g 3    VENTOLIN  (90 Base) MCG/ACT Inhalation Aero Soln Inhale 2 puffs into the lungs 3 (three) times daily as needed for Wheezing. 1 each 5    Microlet Lancets (Real Gravity MICROLET LANCETS) Does not apply Misc Test BS daily DX E 11.9 Non insulin dependent 100 each 11    Glucose Blood (CONTOUR NEXT TEST) In Vitro Strip 1 each by Other route daily.  100 each 11       Allergies:    Penicillins             HIVES    ROS:       PHYSICAL EXAM:     Base Eye Exam       Visual Acuity (Snellen - Linear)         Right Left    Dist cc 20/60 -2 20/40 -1    Dist ph cc 20/40 -2 20/30 -2    Near cc 20/50- 20/30-      Correction: Glasses              Tonometry (Applanation, 8:16 AM)         Right Left    Pressure 18 18              Pachymetry (8/23/2017)         Right Left    Thickness 571/-1.5 572.-1.5              Pupils         Pupils    Right PERRL    Left PERRL              Visual Fields         Left Right     Full Full              Extraocular Movement         Right Left     Full, Ortho Full, Ortho              Neuro/Psych       Oriented x3: Yes    Mood/Affect: Normal              Dilation       Both eyes: 1.0% Mydriacyl and 2.5% Audie Synephrine @ 8:16 AM                  Slit Lamp and Fundus Exam       External Exam         Right Left    External Normal Normal              Slit Lamp Exam         Right Left    Lids/Lashes Dermatochalasis, Meibomian gland dysfunction Papilloma JABARI LLL, Dermatochalasis, Meibomian gland dysfunction    Conjunctiva/Sclera Normal Normal    Cornea Clear- no K spindles Clear- no K spindles    Anterior Chamber Deep and quiet Deep and quiet    Iris No transillumination defects No transillumination defects    Lens 3+ Nuclear sclerosis, Trace Cortical cataract inferonasal   3+ Nuclear sclerosis    Vitreous Vitreous floaters Vitreous floaters              Fundus Exam         Right Left    Disc Good rim  Sloping margin    C/D Ratio 0.7 0.8    Macula Normal- no BDR Normal- no BDR    Vessels Normal Normal    Periphery Normal Normal                  Refraction       Wearing Rx         Sphere Cylinder Axis Add    Right -11.25 +3.00 180 +2.75    Left -9.25 +1.25 020 +2.75      Type: Progressive bifocal              Manifest Refraction         Sphere Cylinder [place] Add Near [place]    Right -12.50 +4.25 180 20/40 +3.00 20/30-    Left -9.75 +1.50 020 20/30+ +3.00 20/20-              Final Rx         Sphere Cylinder [place] Dist VA Add Near [place]    Right -12.50 +4.25 180 20/40 +3.00 20/30-    Left -9.75 +1.50 020 20/30+ +3.00 20/20-      Type: Progressive bifocal                     ASSESSMENT/PLAN:     Diagnoses and Plan:     Type 2 diabetes mellitus with hyperglycemia, without long-term current use of insulin ([facility])  Diabetes type II: no background of retinopathy, no signs of neovascularization noted. Discussed ocular and systemic benefits of blood sugar control. Diagnosis and treatment discussed in detail with patient. Primary open angle glaucoma (POAG) of left eye, mild stage  IOP is stable. Continue Latanoprost at bedtime in the left eye. Will have patient back for next available visual field and OCT with no MD then 4 months for a pressure check    Age-related nuclear cataract of both eyes   Discussed diagnosis of cataracts in detail with patient. Cataract surgery not indicated at this time due to vision level.   Will continue to monitor yearly. Patient will call sooner than 1 year recall if they notice a change in vision. New glasses Rx given today    Floater, vitreous, bilateral   There is no evidence of retinal pathology. All signs and symptoms of retinal detachment/tears explained in detail. Patient instructed to call the office if they experience increase in floaters, increase in flashes of light, loss of vision or curtain or veil effect. Orders Placed This Encounter      OCT, Optic Nerve - OU - Both Eyes      Fisher Visual Field - OU - Both Eyes      Meds This Visit:  Requested Prescriptions     Signed Prescriptions Disp Refills    latanoprost 0.005 % Ophthalmic Solution 3 each 3     Sig: INSTILL 1 DROP INTO LEFT EYE AT BEDTIME        Follow up instructions:  Return for Next Avail VF, OCT with no MD then 4 months for a pressure check. 9/9/2023  Scribed by: Javier Hewitt MD        If you have questions, please do not hesitate to call me. I look forward to following Verner Poe along with you.     Sincerely,        Javier Hewitt MD        CC:   No Recipients    Document electronically generated by: Javier Hewitt MD

## (undated) NOTE — LETTER
September 11, 2021    Bridget Mtz DO  [address] [address]     Patient: Armond Hooker   YOB: 1950   Date of Visit: 9/11/2021       Dear Dr. Dick Beavers DO:    Thank you for referring Saida Simms to me for evaluation.  Here i latanoprost 0.005 % Ophthalmic Solution INSTILL 1 DROP INTO LEFT EYE AT BEDTIME 3 each 3   • simvastatin (ZOCOR) 20 MG Oral Tab Take 1 tablet (20 mg total) by mouth nightly.  90 tablet 0   • VENTOLIN  (90 Base) MCG/ACT Inhalation Aero Soln INHALE 2 P LLL, Dermatochalasis, Meibomian gland dysfunction    Conjunctiva/Sclera Normal Normal    Cornea Clear- no K spindles Clear- no K spindles    Anterior Chamber Deep and quiet Deep and quiet    Iris No transillumination defects No transillumination defects Solution 3 each 3     Sig: INSTILL 1 DROP INTO LEFT EYE AT BEDTIME        Follow up instructions:  Return for Next avail. VF and OCT with no MD, then in 4 months for an IOP .     9/11/2021  Scribed by: Chino Nicole MD        If you have questions, ivana

## (undated) NOTE — LETTER
May 12, 2018    Tamiko Blackman, [address]  [address] [facility]     Patient: Tamiko Ma   YOB: 1950   Date of Visit: 5/12/2018       Dear Dr. Denzel Henry, DO:    Thank you for referring Dulce Calvin to me for evaluation.  Here is (two) times daily with meals. Disp: 180 tablet Rfl: 0   simvastatin (ZOCOR) 20 MG Oral Tab Take 1 tablet (20 mg total) by mouth nightly.  Disp: 90 tablet Rfl: 0   BRANT MICROLET LANCETS Does not apply Misc Use to test blood glucose twice daily as directed D Lids/Lashes Dermatochalasis, Meibomian gland dysfunction Papilloma JABARI LLL, Dermatochalasis, Meibomian gland dysfunction    Conjunctiva/Sclera Normal Normal    Cornea Clear- no K spindles Clear- no K spindles    Anterior Chamber Deep and quiet Deep and qu latanoprost 0.005 % Ophthalmic Solution 3 Bottle 3      Sig: Place 1 drop into the left eye nightly. Follow up instructions:  Return in about 4 months (around 9/12/2018) for Visual field, OCT, IOP check.     5/12/2018  Scribed by: Gege Pacheco

## (undated) NOTE — MR AVS SNAPSHOT
[address] [place] [address]  [phone]               Thank you for choosing us for your health care visit with Kourtney Styles DO. We are glad to serve you and happy to provide you with this summary of your visit.   Please he Assoc Dx:  New onset type 2 diabetes mellitus ([facility] [E11.9]          Reason for Today's Visit     Follow - Up           Medical Issues Discussed Today     Diabetes    Dyslipidemia    New onset type 2 diabetes mellitus    -  Primary      Instructions and I Visits < Visit Summaries. MyChart questions? Call [phone] for help. MyChart is NOT to be used for urgent needs. For medical emergencies, dial 911.            Visit [facility] online at  [email or web address]

## (undated) NOTE — LETTER
10/20/2020            Barron Reese        [address] [facility] [address]       Dear Ana Roberto,    The visual field test you took on 10/17/2020 indicated normal field of vision in the right eye and superior arcuate scotoma (los

## (undated) NOTE — MR AVS SNAPSHOT
[facility] [address] [address]  [phone]               Thank you for choosing us for your health care visit with Kika Hogan DO. We are glad to serve you and happy to provide you with this summary of your visit.   Please he PSA (Screening) [E]    Complete by:  Jan 26, 2017 (Approximate)    Assoc Dx:   Annual physical exam [B02.08]           Gastro Referral - In Network    Complete by:  As directed    Assoc Dx:  Encounter for screening colonoscopy [Z12.11]           EKG In-Off [email or web address]. If you've recently had a stay at the Hospital you can access your discharge instructions in [place] by going to Visits < Admission Summaries.  If you've been to the Emergency Department or your doctor's office, you can view yo Visit [facility] online at  [email or web address]

## (undated) NOTE — LETTER
May 11, 2019    Celestina Ribera DO  [address] [address]     Patient: Cora Bermudez   YOB: 1950   Date of Visit: 5/11/2019       Dear Dr. Mikayla Streeter DO:    Thank you for referring Federico Essencehimanshu to me for evaluation.  Here is my a azithromycin 250 MG Oral Tab Take two tablets by mouth today, then one daily.  Disp: 6 tablet Rfl: 0   BRANT MICROLET LANCETS Does not apply Misc USE TO TEST BLOOD GLUCOSE TWICE DAILY AS DIRECTED Disp: 100 each Rfl: 11   CONTOUR NEXT TEST In Vitro Strip USE LLL, Dermatochalasis, Meibomian gland dysfunction    Conjunctiva/Sclera Trace Injection Trace Injection    Cornea Clear- no K spindles Clear- no K spindles    Anterior Chamber Deep and quiet Deep and quiet    Iris No transillumination defects No transillum Return in about 4 months (around 9/11/2019) for IOP check. 5/11/2019  Scribed by: Eve Chapin MD      If you have questions, please do not hesitate to call me. I look forward to following Viviane Zimmerman along with you.     Sincerely,        Eve Chapin